# Patient Record
Sex: FEMALE | Race: WHITE | NOT HISPANIC OR LATINO | Employment: FULL TIME | URBAN - METROPOLITAN AREA
[De-identification: names, ages, dates, MRNs, and addresses within clinical notes are randomized per-mention and may not be internally consistent; named-entity substitution may affect disease eponyms.]

---

## 2017-09-10 ENCOUNTER — APPOINTMENT (EMERGENCY)
Dept: CT IMAGING | Facility: HOSPITAL | Age: 42
End: 2017-09-10
Payer: COMMERCIAL

## 2017-09-10 ENCOUNTER — HOSPITAL ENCOUNTER (EMERGENCY)
Facility: HOSPITAL | Age: 42
Discharge: HOME | End: 2017-09-10
Admitting: EMERGENCY MEDICINE
Payer: COMMERCIAL

## 2017-09-10 ENCOUNTER — APPOINTMENT (EMERGENCY)
Dept: RADIOLOGY | Facility: HOSPITAL | Age: 42
End: 2017-09-10
Payer: COMMERCIAL

## 2017-09-10 VITALS
HEART RATE: 76 BPM | SYSTOLIC BLOOD PRESSURE: 137 MMHG | DIASTOLIC BLOOD PRESSURE: 74 MMHG | OXYGEN SATURATION: 99 % | WEIGHT: 120.9 LBS | RESPIRATION RATE: 18 BRPM | TEMPERATURE: 98.1 F

## 2017-09-10 DIAGNOSIS — R07.89 CHEST WALL PAIN: Primary | ICD-10-CM

## 2017-09-10 LAB
ALBUMIN SERPL BCP-MCNC: 3.8 G/DL (ref 3.5–5)
ALP SERPL-CCNC: 69 U/L (ref 46–116)
ALT SERPL W P-5'-P-CCNC: 20 U/L (ref 12–78)
ANION GAP SERPL CALCULATED.3IONS-SCNC: 3 MMOL/L (ref 4–13)
APTT PPP: 25 SECONDS (ref 23–35)
AST SERPL W P-5'-P-CCNC: 16 U/L (ref 5–45)
ATRIAL RATE: 80 BPM
ATRIAL RATE: 83 BPM
BACTERIA UR QL AUTO: ABNORMAL /HPF
BASOPHILS # BLD AUTO: 0.01 THOUSANDS/ΜL (ref 0–0.1)
BASOPHILS NFR BLD AUTO: 0 % (ref 0–1)
BILIRUB SERPL-MCNC: 0.4 MG/DL (ref 0.2–1)
BILIRUB UR QL STRIP: NEGATIVE
BUN SERPL-MCNC: 12 MG/DL (ref 5–25)
CALCIUM SERPL-MCNC: 9.1 MG/DL (ref 8.3–10.1)
CHLORIDE SERPL-SCNC: 107 MMOL/L (ref 100–108)
CLARITY UR: CLEAR
CO2 SERPL-SCNC: 29 MMOL/L (ref 21–32)
COLOR UR: YELLOW
COLOR, POC: YELLOW
CREAT SERPL-MCNC: 0.75 MG/DL (ref 0.6–1.3)
EOSINOPHIL # BLD AUTO: 0.26 THOUSAND/ΜL (ref 0–0.61)
EOSINOPHIL NFR BLD AUTO: 3 % (ref 0–6)
ERYTHROCYTE [DISTWIDTH] IN BLOOD BY AUTOMATED COUNT: 12.7 % (ref 11.6–15.1)
ERYTHROCYTE [SEDIMENTATION RATE] IN BLOOD: 1 MM/HOUR (ref 0–20)
GFR SERPL CREATININE-BSD FRML MDRD: 100 ML/MIN/1.73SQ M
GLUCOSE SERPL-MCNC: 91 MG/DL (ref 65–140)
GLUCOSE UR STRIP-MCNC: NEGATIVE MG/DL
HCG UR QL: NEGATIVE
HCT VFR BLD AUTO: 39.2 % (ref 34.8–46.1)
HGB BLD-MCNC: 13.4 G/DL (ref 11.5–15.4)
HGB UR QL STRIP.AUTO: ABNORMAL
INR PPP: 0.94 (ref 0.86–1.16)
KETONES UR STRIP-MCNC: NEGATIVE MG/DL
LACTATE SERPL-SCNC: 0.5 MMOL/L (ref 0.5–2)
LEUKOCYTE ESTERASE UR QL STRIP: NEGATIVE
LIPASE SERPL-CCNC: 141 U/L (ref 73–393)
LYMPHOCYTES # BLD AUTO: 3.09 THOUSANDS/ΜL (ref 0.6–4.47)
LYMPHOCYTES NFR BLD AUTO: 31 % (ref 14–44)
MCH RBC QN AUTO: 33.2 PG (ref 26.8–34.3)
MCHC RBC AUTO-ENTMCNC: 34.2 G/DL (ref 31.4–37.4)
MCV RBC AUTO: 97 FL (ref 82–98)
MONOCYTES # BLD AUTO: 0.53 THOUSAND/ΜL (ref 0.17–1.22)
MONOCYTES NFR BLD AUTO: 5 % (ref 4–12)
NEUTROPHILS # BLD AUTO: 6.22 THOUSANDS/ΜL (ref 1.85–7.62)
NEUTS SEG NFR BLD AUTO: 61 % (ref 43–75)
NITRITE UR QL STRIP: NEGATIVE
NON-SQ EPI CELLS URNS QL MICRO: ABNORMAL /HPF
NRBC BLD AUTO-RTO: 0 /100 WBCS
P AXIS: 48 DEGREES
P AXIS: 63 DEGREES
PH UR STRIP.AUTO: 6.5 [PH] (ref 4.5–8)
PLATELET # BLD AUTO: 250 THOUSANDS/UL (ref 149–390)
PMV BLD AUTO: 10.8 FL (ref 8.9–12.7)
POTASSIUM SERPL-SCNC: 4.1 MMOL/L (ref 3.5–5.3)
PR INTERVAL: 134 MS
PR INTERVAL: 134 MS
PROT SERPL-MCNC: 6.8 G/DL (ref 6.4–8.2)
PROT UR STRIP-MCNC: NEGATIVE MG/DL
PROTHROMBIN TIME: 12.6 SECONDS (ref 12.1–14.4)
QRS AXIS: 63 DEGREES
QRS AXIS: 67 DEGREES
QRSD INTERVAL: 74 MS
QRSD INTERVAL: 78 MS
QT INTERVAL: 350 MS
QT INTERVAL: 390 MS
QTC INTERVAL: 411 MS
QTC INTERVAL: 449 MS
RBC # BLD AUTO: 4.04 MILLION/UL (ref 3.81–5.12)
RBC #/AREA URNS AUTO: ABNORMAL /HPF
SODIUM SERPL-SCNC: 139 MMOL/L (ref 136–145)
SP GR UR STRIP.AUTO: 1.02 (ref 1–1.03)
T WAVE AXIS: 52 DEGREES
T WAVE AXIS: 73 DEGREES
TROPONIN I SERPL-MCNC: <0.02 NG/ML
UROBILINOGEN UR QL STRIP.AUTO: 0.2 E.U./DL
VENTRICULAR RATE: 80 BPM
VENTRICULAR RATE: 83 BPM
WBC # BLD AUTO: 10.11 THOUSAND/UL (ref 4.31–10.16)
WBC #/AREA URNS AUTO: ABNORMAL /HPF

## 2017-09-10 PROCEDURE — 85610 PROTHROMBIN TIME: CPT | Performed by: PHYSICIAN ASSISTANT

## 2017-09-10 PROCEDURE — 36415 COLL VENOUS BLD VENIPUNCTURE: CPT | Performed by: PHYSICIAN ASSISTANT

## 2017-09-10 PROCEDURE — 85025 COMPLETE CBC W/AUTO DIFF WBC: CPT

## 2017-09-10 PROCEDURE — 81025 URINE PREGNANCY TEST: CPT | Performed by: PHYSICIAN ASSISTANT

## 2017-09-10 PROCEDURE — 99285 EMERGENCY DEPT VISIT HI MDM: CPT

## 2017-09-10 PROCEDURE — 83605 ASSAY OF LACTIC ACID: CPT | Performed by: PHYSICIAN ASSISTANT

## 2017-09-10 PROCEDURE — 71275 CT ANGIOGRAPHY CHEST: CPT

## 2017-09-10 PROCEDURE — 93005 ELECTROCARDIOGRAM TRACING: CPT | Performed by: PHYSICIAN ASSISTANT

## 2017-09-10 PROCEDURE — 85730 THROMBOPLASTIN TIME PARTIAL: CPT | Performed by: PHYSICIAN ASSISTANT

## 2017-09-10 PROCEDURE — 81002 URINALYSIS NONAUTO W/O SCOPE: CPT | Performed by: PHYSICIAN ASSISTANT

## 2017-09-10 PROCEDURE — 36415 COLL VENOUS BLD VENIPUNCTURE: CPT

## 2017-09-10 PROCEDURE — 81001 URINALYSIS AUTO W/SCOPE: CPT

## 2017-09-10 PROCEDURE — 85652 RBC SED RATE AUTOMATED: CPT | Performed by: PHYSICIAN ASSISTANT

## 2017-09-10 PROCEDURE — 84484 ASSAY OF TROPONIN QUANT: CPT

## 2017-09-10 PROCEDURE — 71020 HB CHEST X-RAY 2VW FRONTAL&LATL: CPT

## 2017-09-10 PROCEDURE — 83690 ASSAY OF LIPASE: CPT | Performed by: PHYSICIAN ASSISTANT

## 2017-09-10 PROCEDURE — 80053 COMPREHEN METABOLIC PANEL: CPT

## 2017-09-10 PROCEDURE — 74177 CT ABD & PELVIS W/CONTRAST: CPT

## 2017-09-10 PROCEDURE — 96360 HYDRATION IV INFUSION INIT: CPT

## 2017-09-10 PROCEDURE — 93005 ELECTROCARDIOGRAM TRACING: CPT

## 2017-09-10 RX ORDER — BUPRENORPHINE HYDROCHLORIDE AND NALOXONE HYDROCHLORIDE DIHYDRATE 2; .5 MG/1; MG/1
TABLET SUBLINGUAL DAILY
COMMUNITY
End: 2019-01-07

## 2017-09-10 RX ORDER — NAPROXEN 500 MG/1
500 TABLET ORAL 2 TIMES DAILY WITH MEALS
Qty: 30 TABLET | Refills: 0 | Status: SHIPPED | OUTPATIENT
Start: 2017-09-10 | End: 2019-01-07

## 2017-09-10 RX ADMIN — IOHEXOL 100 ML: 350 INJECTION, SOLUTION INTRAVENOUS at 07:24

## 2017-09-10 RX ADMIN — SODIUM CHLORIDE 1000 ML: 0.9 INJECTION, SOLUTION INTRAVENOUS at 07:06

## 2019-01-07 ENCOUNTER — HOSPITAL ENCOUNTER (OUTPATIENT)
Facility: HOSPITAL | Age: 44
Setting detail: OBSERVATION
Discharge: HOME | End: 2019-01-09
Attending: EMERGENCY MEDICINE | Admitting: FAMILY MEDICINE
Payer: COMMERCIAL

## 2019-01-07 ENCOUNTER — APPOINTMENT (EMERGENCY)
Dept: RADIOLOGY | Facility: HOSPITAL | Age: 44
End: 2019-01-07
Payer: COMMERCIAL

## 2019-01-07 ENCOUNTER — APPOINTMENT (EMERGENCY)
Dept: CT IMAGING | Facility: HOSPITAL | Age: 44
End: 2019-01-07
Payer: COMMERCIAL

## 2019-01-07 DIAGNOSIS — R05.9 COUGH: ICD-10-CM

## 2019-01-07 DIAGNOSIS — J20.9 ACUTE BRONCHITIS: ICD-10-CM

## 2019-01-07 DIAGNOSIS — D72.829 LEUKOCYTOSIS: Primary | ICD-10-CM

## 2019-01-07 PROBLEM — R53.83 FATIGUE: Status: ACTIVE | Noted: 2019-01-07

## 2019-01-07 PROBLEM — Z72.0 TOBACCO ABUSE: Status: ACTIVE | Noted: 2019-01-07

## 2019-01-07 PROBLEM — I95.9 HYPOTENSION: Status: ACTIVE | Noted: 2019-01-07

## 2019-01-07 PROBLEM — R50.9 FEVER: Status: ACTIVE | Noted: 2019-01-07

## 2019-01-07 PROBLEM — R07.9 CHEST PAIN: Status: ACTIVE | Noted: 2019-01-07

## 2019-01-07 LAB
ANION GAP SERPL CALCULATED.3IONS-SCNC: 10 MMOL/L (ref 4–13)
BASOPHILS # BLD AUTO: 0.04 THOUSANDS/ΜL (ref 0–0.1)
BASOPHILS NFR BLD AUTO: 0 % (ref 0–1)
BUN SERPL-MCNC: 10 MG/DL (ref 5–25)
CALCIUM SERPL-MCNC: 8.7 MG/DL (ref 8.3–10.1)
CHLORIDE SERPL-SCNC: 98 MMOL/L (ref 100–108)
CO2 SERPL-SCNC: 26 MMOL/L (ref 21–32)
CREAT SERPL-MCNC: 0.85 MG/DL (ref 0.6–1.3)
EOSINOPHIL # BLD AUTO: 0.01 THOUSAND/ΜL (ref 0–0.61)
EOSINOPHIL NFR BLD AUTO: 0 % (ref 0–6)
ERYTHROCYTE [DISTWIDTH] IN BLOOD BY AUTOMATED COUNT: 12.4 % (ref 11.6–15.1)
FLUAV AG SPEC QL IA: NEGATIVE
FLUBV AG SPEC QL IA: NEGATIVE
GFR SERPL CREATININE-BSD FRML MDRD: 85 ML/MIN/1.73SQ M
GLUCOSE SERPL-MCNC: 102 MG/DL (ref 65–140)
HCT VFR BLD AUTO: 40.2 % (ref 34.8–46.1)
HGB BLD-MCNC: 13 G/DL (ref 11.5–15.4)
IMM GRANULOCYTES # BLD AUTO: 0.12 THOUSAND/UL (ref 0–0.2)
IMM GRANULOCYTES NFR BLD AUTO: 1 % (ref 0–2)
LACTATE SERPL-SCNC: 0.7 MMOL/L (ref 0.5–2)
LYMPHOCYTES # BLD AUTO: 2.17 THOUSANDS/ΜL (ref 0.6–4.47)
LYMPHOCYTES NFR BLD AUTO: 10 % (ref 14–44)
MCH RBC QN AUTO: 31.9 PG (ref 26.8–34.3)
MCHC RBC AUTO-ENTMCNC: 32.3 G/DL (ref 31.4–37.4)
MCV RBC AUTO: 99 FL (ref 82–98)
MONOCYTES # BLD AUTO: 1.98 THOUSAND/ΜL (ref 0.17–1.22)
MONOCYTES NFR BLD AUTO: 9 % (ref 4–12)
NEUTROPHILS # BLD AUTO: 16.75 THOUSANDS/ΜL (ref 1.85–7.62)
NEUTS SEG NFR BLD AUTO: 80 % (ref 43–75)
NRBC BLD AUTO-RTO: 0 /100 WBCS
PLATELET # BLD AUTO: 269 THOUSANDS/UL (ref 149–390)
PMV BLD AUTO: 9.8 FL (ref 8.9–12.7)
POTASSIUM SERPL-SCNC: 4.8 MMOL/L (ref 3.5–5.3)
RBC # BLD AUTO: 4.08 MILLION/UL (ref 3.81–5.12)
SODIUM SERPL-SCNC: 134 MMOL/L (ref 136–145)
TROPONIN I SERPL-MCNC: <0.02 NG/ML
WBC # BLD AUTO: 21.07 THOUSAND/UL (ref 4.31–10.16)

## 2019-01-07 PROCEDURE — 36415 COLL VENOUS BLD VENIPUNCTURE: CPT | Performed by: EMERGENCY MEDICINE

## 2019-01-07 PROCEDURE — 83605 ASSAY OF LACTIC ACID: CPT | Performed by: EMERGENCY MEDICINE

## 2019-01-07 PROCEDURE — 93005 ELECTROCARDIOGRAM TRACING: CPT

## 2019-01-07 PROCEDURE — 80048 BASIC METABOLIC PNL TOTAL CA: CPT | Performed by: EMERGENCY MEDICINE

## 2019-01-07 PROCEDURE — 85025 COMPLETE CBC W/AUTO DIFF WBC: CPT | Performed by: EMERGENCY MEDICINE

## 2019-01-07 PROCEDURE — 71250 CT THORAX DX C-: CPT

## 2019-01-07 PROCEDURE — 96374 THER/PROPH/DIAG INJ IV PUSH: CPT

## 2019-01-07 PROCEDURE — 99285 EMERGENCY DEPT VISIT HI MDM: CPT

## 2019-01-07 PROCEDURE — 99220 PR INITIAL OBSERVATION CARE/DAY 70 MINUTES: CPT | Performed by: PHYSICIAN ASSISTANT

## 2019-01-07 PROCEDURE — 87631 RESP VIRUS 3-5 TARGETS: CPT | Performed by: EMERGENCY MEDICINE

## 2019-01-07 PROCEDURE — 71046 X-RAY EXAM CHEST 2 VIEWS: CPT

## 2019-01-07 PROCEDURE — 84484 ASSAY OF TROPONIN QUANT: CPT | Performed by: PHYSICIAN ASSISTANT

## 2019-01-07 PROCEDURE — 96361 HYDRATE IV INFUSION ADD-ON: CPT

## 2019-01-07 PROCEDURE — 87040 BLOOD CULTURE FOR BACTERIA: CPT | Performed by: EMERGENCY MEDICINE

## 2019-01-07 RX ORDER — ACETAMINOPHEN 325 MG/1
650 TABLET ORAL EVERY 6 HOURS PRN
Status: DISCONTINUED | OUTPATIENT
Start: 2019-01-07 | End: 2019-01-09 | Stop reason: HOSPADM

## 2019-01-07 RX ORDER — GUAIFENESIN 600 MG
600 TABLET, EXTENDED RELEASE 12 HR ORAL EVERY 12 HOURS SCHEDULED
Status: DISCONTINUED | OUTPATIENT
Start: 2019-01-07 | End: 2019-01-09 | Stop reason: HOSPADM

## 2019-01-07 RX ORDER — ONDANSETRON 2 MG/ML
4 INJECTION INTRAMUSCULAR; INTRAVENOUS EVERY 6 HOURS PRN
Status: DISCONTINUED | OUTPATIENT
Start: 2019-01-07 | End: 2019-01-09 | Stop reason: HOSPADM

## 2019-01-07 RX ORDER — SODIUM CHLORIDE 9 MG/ML
125 INJECTION, SOLUTION INTRAVENOUS CONTINUOUS
Status: DISCONTINUED | OUTPATIENT
Start: 2019-01-07 | End: 2019-01-09

## 2019-01-07 RX ORDER — DIPHENHYDRAMINE HCL 25 MG
25 TABLET ORAL ONCE
Status: COMPLETED | OUTPATIENT
Start: 2019-01-07 | End: 2019-01-07

## 2019-01-07 RX ORDER — KETOROLAC TROMETHAMINE 30 MG/ML
15 INJECTION, SOLUTION INTRAMUSCULAR; INTRAVENOUS ONCE
Status: COMPLETED | OUTPATIENT
Start: 2019-01-07 | End: 2019-01-07

## 2019-01-07 RX ORDER — METOCLOPRAMIDE HYDROCHLORIDE 5 MG/ML
10 INJECTION INTRAMUSCULAR; INTRAVENOUS ONCE
Status: COMPLETED | OUTPATIENT
Start: 2019-01-07 | End: 2019-01-07

## 2019-01-07 RX ADMIN — SODIUM CHLORIDE 1000 ML: 0.9 INJECTION, SOLUTION INTRAVENOUS at 18:52

## 2019-01-07 RX ADMIN — SODIUM CHLORIDE 125 ML/HR: 0.9 INJECTION, SOLUTION INTRAVENOUS at 21:12

## 2019-01-07 RX ADMIN — MORPHINE SULFATE 2 MG: 2 INJECTION, SOLUTION INTRAMUSCULAR; INTRAVENOUS at 18:50

## 2019-01-07 RX ADMIN — SODIUM CHLORIDE 1000 ML: 0.9 INJECTION, SOLUTION INTRAVENOUS at 15:37

## 2019-01-07 RX ADMIN — CEFTRIAXONE 1000 MG: 1 INJECTION, POWDER, FOR SOLUTION INTRAMUSCULAR; INTRAVENOUS at 19:26

## 2019-01-07 RX ADMIN — METOCLOPRAMIDE 10 MG: 5 INJECTION, SOLUTION INTRAMUSCULAR; INTRAVENOUS at 22:25

## 2019-01-07 RX ADMIN — DIPHENHYDRAMINE HCL 25 MG: 25 TABLET ORAL at 22:25

## 2019-01-07 RX ADMIN — GUAIFENESIN 600 MG: 600 TABLET, EXTENDED RELEASE ORAL at 22:25

## 2019-01-07 RX ADMIN — KETOROLAC TROMETHAMINE 15 MG: 30 INJECTION, SOLUTION INTRAMUSCULAR at 22:25

## 2019-01-07 RX ADMIN — AZITHROMYCIN MONOHYDRATE 500 MG: 500 INJECTION, POWDER, LYOPHILIZED, FOR SOLUTION INTRAVENOUS at 22:26

## 2019-01-07 RX ADMIN — KETOROLAC TROMETHAMINE 15 MG: 30 INJECTION, SOLUTION INTRAMUSCULAR at 15:36

## 2019-01-07 NOTE — ED PROVIDER NOTES
History  Chief Complaint   Patient presents with    Chest Pain     Pt reports being sick for the past three weeks  Pt reports intermittent fevers/fatigue/bodyavhes/   Pt reports sharp chest pain since last night  Pt states pain increases with deep breath and movement      46-year-old female with a history of pneumonia presents with worsening of her URI symptoms over the last 3 days and right-sided chest pain  Patient states that she has been sick over [place]  She was diagnosed clinically with influenza and was starting to feel better until 3 days ago when she states she developed fevers up to 103 and cough sometimes productive of yellow sputum  She had 2 episodes of vomiting today  No diarrhea  No known ill contacts or recent travel  History provided by:  Patient   used: No    Chest Pain   Pain location:  R chest  Pain quality: sharp    Pain radiates to:  Does not radiate  Pain radiates to the back: no    Pain severity:  Unable to specify  Onset quality:  Gradual  Duration:  1 day  Timing:  Constant  Progression:  Unchanged  Chronicity:  New  Context: breathing and movement    Relieved by:  Nothing  Worsened by:  Deep breathing and coughing  Ineffective treatments: Motrin    Associated symptoms: cough, fever, nausea, shortness of breath and vomiting    Associated symptoms: no abdominal pain, no altered mental status, no anorexia, no anxiety, no back pain, no claudication, no diaphoresis, no dizziness, no dysphagia, no fatigue, no headache, no heartburn, no lower extremity edema, no near-syncope, no numbness, no orthopnea, no palpitations, no PND, no syncope and no weakness    Cough:     Cough characteristics:  Productive    Sputum characteristics:  Yellow    Severity:  Unable to specify    Onset quality:  Gradual    Duration:  3 days    Timing:  Intermittent    Progression:  Unchanged    Chronicity:  New  Fever:     Duration:  3 days    Timing:  Intermittent    Max temp PTA (F): 103    Temp source:  Oral    Progression:  Unchanged  Shortness of breath:     Severity:  Unable to specify    Onset quality:  Gradual    Duration:  3 days    Timing:  Intermittent    Progression:  Unchanged  Risk factors: smoking    Risk factors: no coronary artery disease, no diabetes mellitus, no hypertension, no immobilization, not obese, no prior DVT/PE and no surgery        None       Past Medical History:   Diagnosis Date    Ectopic pregnancy     Pneumonia        History reviewed  No pertinent surgical history  History reviewed  No pertinent family history  I have reviewed and agree with the history as documented  Social History   Substance Use Topics    Smoking status: Current Every Day Smoker     Packs/day: 0 50    Smokeless tobacco: Never Used    Alcohol use Yes      Comment: occasionally        Review of Systems   Constitutional: Positive for fever  Negative for chills, diaphoresis and fatigue  HENT: Negative  Negative for congestion, rhinorrhea, sore throat and trouble swallowing  Eyes: Negative  Negative for discharge, redness and itching  Respiratory: Positive for cough and shortness of breath  Negative for apnea, chest tightness and wheezing  Cardiovascular: Positive for chest pain  Negative for palpitations, orthopnea, claudication, leg swelling, syncope, PND and near-syncope  Gastrointestinal: Positive for nausea and vomiting  Negative for abdominal pain, anorexia and heartburn  Endocrine: Negative  Genitourinary: Negative  Negative for flank pain, frequency and urgency  Musculoskeletal: Negative  Negative for back pain  Skin: Negative  Allergic/Immunologic: Negative  Neurological: Negative  Negative for dizziness, syncope, weakness, light-headedness, numbness and headaches  Hematological: Negative  All other systems reviewed and are negative  Physical Exam  Physical Exam   Constitutional: She is oriented to person, place, and time   She appears well-developed and well-nourished  Non-toxic appearance  She does not have a sickly appearance  She does not appear ill  No distress  HENT:   Head: Normocephalic and atraumatic  Right Ear: Tympanic membrane and external ear normal    Left Ear: Tympanic membrane and external ear normal    Mouth/Throat: Oropharynx is clear and moist  No oropharyngeal exudate  Eyes: Pupils are equal, round, and reactive to light  Conjunctivae and EOM are normal  Right eye exhibits no discharge  Left eye exhibits no discharge  Neck: Normal range of motion  Neck supple  Cardiovascular: Normal rate, regular rhythm and normal heart sounds  Exam reveals no gallop and no friction rub  No murmur heard  Pulmonary/Chest: Effort normal and breath sounds normal  No stridor  No respiratory distress  She has no wheezes  She has no rales  She exhibits no tenderness  Abdominal: Soft  Bowel sounds are normal  She exhibits no distension and no mass  There is no tenderness  No hernia  Lymphadenopathy:     She has no cervical adenopathy  Neurological: She is alert and oriented to person, place, and time  She has normal reflexes  She exhibits normal muscle tone  Skin: Skin is warm and dry  No rash noted  She is not diaphoretic  No erythema  No pallor  Psychiatric: She has a normal mood and affect  Nursing note and vitals reviewed        Vital Signs  ED Triage Vitals   Temperature Pulse Respirations Blood Pressure SpO2   01/07/19 1509 01/07/19 1509 01/07/19 1509 01/07/19 1509 01/07/19 1509   99 9 °F (37 7 °C) 79 16 105/60 99 %      Temp Source Heart Rate Source Patient Position - Orthostatic VS BP Location FiO2 (%)   01/07/19 1509 01/07/19 1509 01/07/19 1509 01/07/19 1509 --   Oral Monitor Sitting Right arm       Pain Score       01/07/19 1536       9           Vitals:    01/07/19 1509 01/07/19 1706 01/07/19 1819 01/07/19 1827   BP: 105/60 90/55 (!) 85/50 91/55   Pulse: 79 77 95    Patient Position - Orthostatic VS: Sitting Lying Lying        Visual Acuity      ED Medications  Medications   ceftriaxone (ROCEPHIN) 1 g/50 mL in dextrose IVPB (not administered)   azithromycin (ZITHROMAX) 500 mg in sodium chloride 0 9% 250mL IVPB 500 mg (not administered)   sodium chloride 0 9 % bolus 1,000 mL (not administered)   morphine injection 2 mg (not administered)   sodium chloride 0 9 % bolus 1,000 mL (0 mL Intravenous Stopped 1/7/19 1706)   ketorolac (TORADOL) injection 15 mg (15 mg Intravenous Given 1/7/19 1536)       Diagnostic Studies  Results Reviewed     Procedure Component Value Units Date/Time    Procalcitonin [[id number]]     Lab Status:  No result Specimen:  Blood     Blood culture #1 [[id number]]     Lab Status:  No result Specimen:  Blood     Blood culture #2 [[id number]]     Lab Status:  No result Specimen:  Blood     Lactic acid, plasma [[id number]]  (Normal) Collected:  01/07/19 1727    Lab Status:  Final result Specimen:  Blood from Arm, Right Updated:  01/07/19 1757     LACTIC ACID 0 7 mmol/L     Narrative:         Result may be elevated if tourniquet was used during collection  Rapid Influenza Screen with Reflex PCR [[id number]]  (Normal) Collected:  01/07/19 1553    Lab Status:  Final result Specimen:  Nasopharyngeal from Nasopharyngeal Swab Updated:  01/07/19 1622     Rapid Influenza A Ag Negative     Rapid Influenza B Ag Negative    INFLUENZA A/B AND RSV, PCR [[id number]] Collected:  01/07/19 1553    Lab Status:   In process Specimen:  Nasopharyngeal from Nasopharyngeal Swab Updated:  01/07/19 8231    Basic metabolic panel [[id number]]  (Abnormal) Collected:  01/07/19 1535    Lab Status:  Final result Specimen:  Blood from Arm, Right Updated:  01/07/19 1558     Sodium 134 (L) mmol/L      Potassium 4 8 mmol/L      Chloride 98 (L) mmol/L      CO2 26 mmol/L      ANION GAP 10 mmol/L      BUN 10 mg/dL      Creatinine 0 85 mg/dL      Glucose 102 mg/dL      Calcium 8 7 mg/dL      eGFR 85 ml/min/1 73sq m     Narrative:         National Kidney Disease Education Program recommendations are as follows:  GFR calculation is accurate only with a steady state creatinine  Chronic Kidney disease less than 60 ml/min/1 73 sq  meters  Kidney failure less than 15 ml/min/1 73 sq  meters  CBC and differential [[id number]]  (Abnormal) Collected:  01/07/19 1535    Lab Status:  Final result Specimen:  Blood from Arm, Right Updated:  01/07/19 1542     WBC 21 07 (H) Thousand/uL      RBC 4 08 Million/uL      Hemoglobin 13 0 g/dL      Hematocrit 40 2 %      MCV 99 (H) fL      MCH 31 9 pg      MCHC 32 3 g/dL      RDW 12 4 %      MPV 9 8 fL      Platelets 787 Thousands/uL      nRBC 0 /100 WBCs      Neutrophils Relative 80 (H) %      Immat GRANS % 1 %      Lymphocytes Relative 10 (L) %      Monocytes Relative 9 %      Eosinophils Relative 0 %      Basophils Relative 0 %      Neutrophils Absolute 16 75 (H) Thousands/µL      Immature Grans Absolute 0 12 Thousand/uL      Lymphocytes Absolute 2 17 Thousands/µL      Monocytes Absolute 1 98 (H) Thousand/µL      Eosinophils Absolute 0 01 Thousand/µL      Basophils Absolute 0 04 Thousands/µL                  XR chest 2 views   Final Result by Verena Gloria MD (01/07 1743)      No acute cardiopulmonary disease  Workstation performed: [id number]         CT chest without contrast   Final Result by Sarah Nuñez MD (01/07 1633)      1  Scattered dependent debris noted in the upper trachea  Variable mild bronchial wall thickening  Correlate for bronchial airways disease  No findings for focal infiltrate  The study was marked in EPIC for significant notification  Workstation performed: [id number]                    Procedures  Procedures       Phone Contacts  ED Phone Contact    ED Course  ED Course as of Jan 07 1840 Mon Jan 07, 2019   [facility] [address] Spoke with patient regarding results  She is in agreement with admission  She is complaining of lower sternal pain every time she takes a deep breath or moves    On exam she has no abdominal tenderness  MDM  Number of Diagnoses or Management Options  Diagnosis management comments: 21-year-old female presents with ongoing symptoms of fevers and coughing  She now has sharp right-sided chest pain with cough and deep breathing  She was febrile to 103 at home  On exam she appears well in no distress  Her lungs are clear  She has a history of pneumonia  Will check basic labs, give Toradol for pain and IV fluids for dehydration due to lack of p  O  Intake and vomiting today  Will do chest x-ray to rule out pneumonia         Amount and/or Complexity of Data Reviewed  Clinical lab tests: ordered and reviewed  Tests in the radiology section of CPT®: ordered and reviewed  Independent visualization of images, tracings, or specimens: yes      CritCare Time    Disposition  Final diagnoses:   Leukocytosis   Acute bronchitis     Time reflects when diagnosis was documented in both MDM as applicable and the Disposition within this note     Time User Action Codes Description Comment    1/7/2019  6:39 PM Abhishek Myers Add [O46 [id number]] Leukocytosis     1/7/2019  6:39 PM Maeola Hashimoto A Add [J20 9] Acute bronchitis       ED Disposition     ED Disposition Condition Comment    Admit  Case was discussed with dr Imer Foster and the patient's admission status was agreed to be Admission Status: observation status to the service of Dr Imer Foster   Follow-up Information    None         Patient's Medications   Discharge Prescriptions    No medications on file     No discharge procedures on file      ED Provider  Electronically Signed by           Omar March, DO 01/07/19 5448

## 2019-01-07 NOTE — ED PROCEDURE NOTE
PROCEDURE  ECG 12 Lead Documentation  Date/Time: 1/7/2019 3:54 PM  Performed by: Peri Sifuentes  Authorized by: Peri Sifuentes     Indications / Diagnosis:  Chest pain  ECG reviewed by me, the ED Provider: yes    Patient location:  ED  Interpretation:     Interpretation: normal    Rate:     ECG rate assessment: normal    Rhythm:     Rhythm: sinus rhythm    Ectopy:     Ectopy: none    Conduction:     Conduction: normal    ST segments:     ST segments:  Normal  T waves:     T waves: normal           Donovan Balderas DO  01/07/19 1554

## 2019-01-08 LAB
ALBUMIN SERPL BCP-MCNC: 2.3 G/DL (ref 3.5–5)
ALP SERPL-CCNC: 93 U/L (ref 46–116)
ALT SERPL W P-5'-P-CCNC: 36 U/L (ref 12–78)
AMPHETAMINES SERPL QL SCN: NEGATIVE
ANION GAP SERPL CALCULATED.3IONS-SCNC: 9 MMOL/L (ref 4–13)
AST SERPL W P-5'-P-CCNC: 24 U/L (ref 5–45)
BACTERIA UR QL AUTO: ABNORMAL /HPF
BARBITURATES UR QL: NEGATIVE
BASOPHILS # BLD AUTO: 0.03 THOUSANDS/ΜL (ref 0–0.1)
BASOPHILS NFR BLD AUTO: 0 % (ref 0–1)
BENZODIAZ UR QL: NEGATIVE
BILIRUB SERPL-MCNC: 0.47 MG/DL (ref 0.2–1)
BILIRUB UR QL STRIP: NEGATIVE
BUN SERPL-MCNC: 10 MG/DL (ref 5–25)
CALCIUM SERPL-MCNC: 7.7 MG/DL (ref 8.3–10.1)
CHLORIDE SERPL-SCNC: 106 MMOL/L (ref 100–108)
CHOLEST SERPL-MCNC: 136 MG/DL (ref 50–200)
CLARITY UR: CLEAR
CO2 SERPL-SCNC: 22 MMOL/L (ref 21–32)
COCAINE UR QL: NEGATIVE
COLOR UR: YELLOW
CREAT SERPL-MCNC: 0.72 MG/DL (ref 0.6–1.3)
EOSINOPHIL # BLD AUTO: 0.06 THOUSAND/ΜL (ref 0–0.61)
EOSINOPHIL NFR BLD AUTO: 1 % (ref 0–6)
ERYTHROCYTE [DISTWIDTH] IN BLOOD BY AUTOMATED COUNT: 12.6 % (ref 11.6–15.1)
FLUAV AG SPEC QL: NORMAL
FLUBV AG SPEC QL: NORMAL
GFR SERPL CREATININE-BSD FRML MDRD: 104 ML/MIN/1.73SQ M
GLUCOSE P FAST SERPL-MCNC: 132 MG/DL (ref 65–99)
GLUCOSE SERPL-MCNC: 132 MG/DL (ref 65–140)
GLUCOSE UR STRIP-MCNC: NEGATIVE MG/DL
HCT VFR BLD AUTO: 32.3 % (ref 34.8–46.1)
HDLC SERPL-MCNC: 27 MG/DL (ref 40–60)
HGB BLD-MCNC: 10.3 G/DL (ref 11.5–15.4)
HGB UR QL STRIP.AUTO: ABNORMAL
IMM GRANULOCYTES # BLD AUTO: 0.04 THOUSAND/UL (ref 0–0.2)
IMM GRANULOCYTES NFR BLD AUTO: 0 % (ref 0–2)
KETONES UR STRIP-MCNC: NEGATIVE MG/DL
L PNEUMO1 AG UR QL IA.RAPID: NEGATIVE
LDLC SERPL CALC-MCNC: 90 MG/DL (ref 0–100)
LEUKOCYTE ESTERASE UR QL STRIP: NEGATIVE
LYMPHOCYTES # BLD AUTO: 2.26 THOUSANDS/ΜL (ref 0.6–4.47)
LYMPHOCYTES NFR BLD AUTO: 18 % (ref 14–44)
MCH RBC QN AUTO: 31.8 PG (ref 26.8–34.3)
MCHC RBC AUTO-ENTMCNC: 31.9 G/DL (ref 31.4–37.4)
MCV RBC AUTO: 100 FL (ref 82–98)
METHADONE UR QL: NEGATIVE
MONOCYTES # BLD AUTO: 1.22 THOUSAND/ΜL (ref 0.17–1.22)
MONOCYTES NFR BLD AUTO: 10 % (ref 4–12)
NEUTROPHILS # BLD AUTO: 8.72 THOUSANDS/ΜL (ref 1.85–7.62)
NEUTS SEG NFR BLD AUTO: 71 % (ref 43–75)
NITRITE UR QL STRIP: NEGATIVE
NON-SQ EPI CELLS URNS QL MICRO: ABNORMAL /HPF
NONHDLC SERPL-MCNC: 109 MG/DL
NRBC BLD AUTO-RTO: 0 /100 WBCS
OPIATES UR QL SCN: POSITIVE
PCP UR QL: NEGATIVE
PH UR STRIP.AUTO: 6 [PH] (ref 4.5–8)
PLATELET # BLD AUTO: 200 THOUSANDS/UL (ref 149–390)
PMV BLD AUTO: 10.7 FL (ref 8.9–12.7)
POTASSIUM SERPL-SCNC: 3.7 MMOL/L (ref 3.5–5.3)
PROCALCITONIN SERPL-MCNC: <0.05 NG/ML
PROT SERPL-MCNC: 5.6 G/DL (ref 6.4–8.2)
PROT UR STRIP-MCNC: NEGATIVE MG/DL
RBC # BLD AUTO: 3.24 MILLION/UL (ref 3.81–5.12)
RBC #/AREA URNS AUTO: ABNORMAL /HPF
RSV B RNA SPEC QL NAA+PROBE: NORMAL
S PNEUM AG UR QL: NEGATIVE
SODIUM SERPL-SCNC: 137 MMOL/L (ref 136–145)
SP GR UR STRIP.AUTO: 1.01 (ref 1–1.03)
THC UR QL: POSITIVE
TRIGL SERPL-MCNC: 94 MG/DL
UROBILINOGEN UR QL STRIP.AUTO: 1 E.U./DL
WBC # BLD AUTO: 12.33 THOUSAND/UL (ref 4.31–10.16)
WBC #/AREA URNS AUTO: ABNORMAL /HPF

## 2019-01-08 PROCEDURE — 99232 SBSQ HOSP IP/OBS MODERATE 35: CPT | Performed by: FAMILY MEDICINE

## 2019-01-08 PROCEDURE — 87449 NOS EACH ORGANISM AG IA: CPT | Performed by: PHYSICIAN ASSISTANT

## 2019-01-08 PROCEDURE — 81001 URINALYSIS AUTO W/SCOPE: CPT | Performed by: PHYSICIAN ASSISTANT

## 2019-01-08 PROCEDURE — 84145 PROCALCITONIN (PCT): CPT | Performed by: PHYSICIAN ASSISTANT

## 2019-01-08 PROCEDURE — 85025 COMPLETE CBC W/AUTO DIFF WBC: CPT | Performed by: PHYSICIAN ASSISTANT

## 2019-01-08 PROCEDURE — 80307 DRUG TEST PRSMV CHEM ANLYZR: CPT | Performed by: PHYSICIAN ASSISTANT

## 2019-01-08 PROCEDURE — 80053 COMPREHEN METABOLIC PANEL: CPT | Performed by: PHYSICIAN ASSISTANT

## 2019-01-08 PROCEDURE — 99225 PR SBSQ OBSERVATION CARE/DAY 25 MINUTES: CPT | Performed by: PHYSICIAN ASSISTANT

## 2019-01-08 PROCEDURE — 83036 HEMOGLOBIN GLYCOSYLATED A1C: CPT | Performed by: PHYSICIAN ASSISTANT

## 2019-01-08 PROCEDURE — 80061 LIPID PANEL: CPT | Performed by: PHYSICIAN ASSISTANT

## 2019-01-08 RX ORDER — BENZONATATE 100 MG/1
100 CAPSULE ORAL ONCE AS NEEDED
Status: COMPLETED | OUTPATIENT
Start: 2019-01-08 | End: 2019-01-09

## 2019-01-08 RX ADMIN — GUAIFENESIN 600 MG: 600 TABLET, EXTENDED RELEASE ORAL at 08:09

## 2019-01-08 RX ADMIN — SODIUM CHLORIDE 125 ML/HR: 0.9 INJECTION, SOLUTION INTRAVENOUS at 06:33

## 2019-01-08 RX ADMIN — SODIUM CHLORIDE 125 ML/HR: 0.9 INJECTION, SOLUTION INTRAVENOUS at 17:34

## 2019-01-08 RX ADMIN — ENOXAPARIN SODIUM 40 MG: 40 INJECTION SUBCUTANEOUS at 08:10

## 2019-01-08 RX ADMIN — ACETAMINOPHEN 650 MG: 325 TABLET, FILM COATED ORAL at 08:09

## 2019-01-08 RX ADMIN — GUAIFENESIN 600 MG: 600 TABLET, EXTENDED RELEASE ORAL at 21:24

## 2019-01-08 RX ADMIN — SODIUM CHLORIDE 1000 ML: 0.9 INJECTION, SOLUTION INTRAVENOUS at 14:56

## 2019-01-08 NOTE — ED NOTES
Primary nurse, Alexia Bess called, informed that pt will be up after second set of blood cultures are collected        Chani No RN  01/07/19 0428

## 2019-01-08 NOTE — PROGRESS NOTES
[facility] Progress Note  Patient: Lucita Jasso 44 y o  female   MRN: [id number]  PCP: No primary care provider on file  Unit/Bed#: E4 -01 Encounter: [id number]  Date Of Visit: 01/08/19    Assessment:    Principal Problem:    Cough  Active Problems:    Leukocytosis    Fever    Fatigue    Chest pain    Hypotension    Tobacco abuse      Plan:    · Fever / fatigue / body aches / cough x1 month: CT chest reveals scattered dependent debris noted in the upper trachea  Variable mild bronchial wall thickening  Correlate for bronchial airways disease  No findings for focal infiltrate  Presented with elevated WBC 21 and elevated neutrophils of 16, low-grade fever of 99 9  Procalcitonin still pending as patient is a poor stick  Influenza A/B/RSV PCR negative  Blood cultures and urine antigens Legionella strep pneumonia pending  Started on ceftriaxone and azithro in ED which will now be discontinued given procalcitonin negative    -leukocytosis improving to 12 33   -has remained without fever    -continue supportive care, Mucinex, IV fluids     · Chest pressure:  Central chest pressure along with generalized body aches  Denies history of any medical problems or taking any medication on a daily basis  No hypertension, hyperlipidemia, or diabetes  No history of CAD or stenting  Will obtain troponin to ensure this is not  Troponin was negative  lipid panel without elevated cholesterol  A1c pending  Appears urine drug screen is positive for opiates and THC      · Leukocytosis:  WBC 21 07  Will continue to trend on IV antibiotics listed above  Obtain urinalysis given none obtained on admission  Patient does note she has had some urgency and foul-smelling urine over the past few days   -leukocytosis improved today and is currently 12 33     · Hyponatremia:  Sodium 134  Will monitor on gentle IV fluids    Await repeat CMP     · Hypotension:  Presenting with low blood pressures systolically in the 90s  Received 1 L fluid bolus x2 in ED  Will continue with IV fluids at 125 mL/hour    -still with low blood pressure is 90/44, 85/61    -will administer a 1 L fluid bolus now      · Headache:  Complains of a posterior headache that is described as pounding and patient is complains of being very sensitive to light  Will provide headache cocktail with Toradol, Reglan, Benadryl  P r n  Tylenol  No nucal rigidity and neuro exam within normal limits  This has improved status post cocktail      · Tobacco abuse:  Smokes 1/2 pack to 1 pack daily  Counseled on cessation  On the patch  · Substance abuse:  Found to have positive opioids and positive THC and rapid urine drug screen  VTE Pharmacologic Prophylaxis:   Pharmacologic: Enoxaparin (Lovenox)  Mechanical VTE Prophylaxis in Place: Yes    Discharge Plan:  [facility] tomorrow once blood pressures have improved  Continued admission given hypotension  Discussions with Specialists or Other Care Team Provider:  Dr Deborah Rose    Education and Discussions with Family / Patient:  Patient    Time Spent for Care: 20 minutes  More than 50% of total time spent on counseling and coordination of care as described above  Current Length of Stay: 0 day(s)  Current Patient Status: Observation   Code Status: Level 1 - Full Code    Subjective:   Resting in bed  Feels slightly better compared to yesterday on IV fluids  Urine pending  Still with cough but reports she is able to bring more mucus up  Vomited once last night but was able to tolerate a light breakfast today  Still with chest pressure worsened with coughing  Objective:     Vitals:   Temp (24hrs), Av 9 °F (37 2 °C), Min:98 5 °F (36 9 °C), Max:99 9 °F (37 7 °C)    Temp:  [98 5 °F (36 9 °C)-99 9 °F (37 7 °C)] 98 7 °F (37 1 °C)  HR:  [75-95] 75  Resp:  [16-18] 18  BP: ()/(44-61) 90/44  SpO2:  [95 %-100 %] 99 %  Body mass index is 25 06 kg/m²       Input and Output Summary (last 24 hours): Intake/Output Summary (Last 24 hours) at 01/08/19 1024  Last data filed at 01/08/19 0620   Gross per 24 hour   Intake          3141 67 ml   Output                0 ml   Net          3141 67 ml       Physical Exam:     Physical Exam   Constitutional: She is oriented to person, place, and time  She appears well-developed and well-nourished  No distress  HENT:   Head: Normocephalic and atraumatic  Eyes: Conjunctivae are normal    Cardiovascular: Normal rate, regular rhythm and normal heart sounds  Pulmonary/Chest: Effort normal and breath sounds normal  No respiratory distress  She has no wheezes  She has no rales  She exhibits no tenderness  Abdominal: Soft  Bowel sounds are normal  She exhibits no distension and no mass  There is no tenderness  There is no rebound and no guarding  Neurological: She is alert and oriented to person, place, and time  Skin: Skin is warm and dry  She is not diaphoretic  Psychiatric: She has a normal mood and affect  Her behavior is normal    Nursing note and vitals reviewed  Additional Data:     Labs:      Results from last 7 days  Lab Units 01/08/19  0914   WBC Thousand/uL 12 33*   HEMOGLOBIN g/dL 10 3*   HEMATOCRIT % 32 3*   PLATELETS Thousands/uL 200   NEUTROS PCT % 71   LYMPHS PCT % 18   MONOS PCT % 10   EOS PCT % 1       Results from last 7 days  Lab Units 01/07/19  1535   POTASSIUM mmol/L 4 8   CHLORIDE mmol/L 98*   CO2 mmol/L 26   BUN mg/dL 10   CREATININE mg/dL 0 85   CALCIUM mg/dL 8 7           * I Have Reviewed All Lab Data Listed Above  * Additional Pertinent Lab Tests Reviewed:  [facility] Admission Reviewed    Imaging:    Imaging Reports Reviewed Today Include:   Imaging Personally Reviewed by Myself Includes:      Recent Cultures (last 7 days):       Results from last 7 days  Lab Units 01/07/19  1553   INFLUENZA B PCR  None Detected   RSV PCR  None Detected       Last 24 Hours Medication List:     Current Facility-Administered Medications:  acetaminophen 650 mg Oral Q6H PRN Candace Bates PA-C    enoxaparin 40 mg Subcutaneous Daily Candace Bates PA-C    guaiFENesin 600 mg Oral Q12H [facility] Candace Bates PA-C    ondansetron 4 mg Intravenous Q6H PRN Candace Bates PA-C    sodium chloride 125 mL/hr Intravenous Continuous Guero Pierre PA-C Last Rate: 125 mL/hr (01/08/19 4092)        Today, Patient Was Seen By: Guero Pierre PA-C    ** Please Note: This note has been constructed using a voice recognition system   **

## 2019-01-08 NOTE — PLAN OF CARE
CARDIOVASCULAR - ADULT     Maintains optimal cardiac output and hemodynamic stability Progressing        DISCHARGE PLANNING     Discharge to home or other facility with appropriate resources Progressing        GASTROINTESTINAL - ADULT     Minimal or absence of nausea and/or vomiting Progressing     Maintains adequate nutritional intake Progressing        HEMATOLOGIC - ADULT     Maintains hematologic stability Progressing        INFECTION - ADULT     Absence or prevention of progression during hospitalization Progressing     Absence of fever/infection during neutropenic period Progressing        Knowledge Deficit     Patient/family/caregiver demonstrates understanding of disease process, treatment plan, medications, and discharge instructions Progressing        METABOLIC, FLUID AND ELECTROLYTES - ADULT     Electrolytes maintained within normal limits Progressing     Fluid balance maintained Progressing        PAIN - ADULT     Verbalizes/displays adequate comfort level or baseline comfort level Progressing        Potential for Falls     Patient will remain free of falls Progressing        RESPIRATORY - ADULT     Achieves optimal ventilation and oxygenation Progressing        SAFETY ADULT     Maintain or return to baseline ADL function Progressing     Maintain or return mobility status to optimal level Progressing     Patient will remain free of falls Progressing

## 2019-01-08 NOTE — H&P
History and Physical - [facility]    Patient Information: Lucita Jasso 44 y o  female MRN: [id number]  Unit/Bed#: E4 -01 Encounter: [id number]  Admitting Physician: Olamide Espino PA-C  PCP: No primary care provider on file  Date of Admission:  01/07/19    Assessment/Plan:    Hospital Problem List:     Principal Problem:    Cough  Active Problems:    Leukocytosis    Fever    Fatigue    Chest pain    Hypotension    Tobacco abuse      Primary Problem(s):  · Fever / fatigue / body aches / cough x1 month  · CT chest reveals scattered dependent debris noted in the upper trachea  Variable mild bronchial wall thickening  Correlate for bronchial airways disease  No findings for focal infiltrate  · Presented with elevated WBC 21 and elevated neutrophils of 16, low-grade fever of 99 9  · Rapid influenza negative however A/B/RSV PCR pending  · Blood cultures and procalcitonin pending  · Trend procalcitonin and leukocytosis  · Check urine antigens Legionella strep pneumonia  · Started on ceftriaxone and azithro in ED which will be continued for now  Additional Problems:   · Chest pressure:  Central chest pressure along with generalized body aches  Denies history of any medical problems or taking any medication on a daily basis  No hypertension, hyperlipidemia, or diabetes  No history of CAD or stenting  Will obtain troponin to ensure this is not  Check lipid panel and hemoglobin A1c  Also obtain urine drug screen  · Leukocytosis:  WBC 21 07  Will continue to trend on IV antibiotics listed above  Will obtain urinalysis given none obtained on admission  Patient does note she has had some urgency and foul-smelling urine over the past few days  · Hyponatremia:  Sodium 134  Will monitor on gentle IV fluids    · Hypotension:  Presenting with low blood pressures systolically in the 97U  Received 1 L fluid bolus x2 in ED    Will continue with IV fluids at 125 mL/hour  · Headache:  Complains of a posterior headache that is described as pounding and patient is complains of being very sensitive to light  Will provide headache cocktail with Toradol, Reglan, Benadryl  P r n  Tylenol  No nucal rigidity and neuro exam within normal limits  · Tobacco abuse:  Smokes 1/2 pack to 1 pack daily  Counseled on cessation  On the patch  VTE Prophylaxis: Enoxaparin (Lovenox)  / sequential compression device   Code Status: full code  Anticipated Length of Stay:  Patient will be admitted on an Observation basis with an anticipated length of stay of  Less than 2 midnights  Justification for Hospital Stay: fever, fatigue, body aches with need for further supportive care, fluid hydration and workup even significant leukocytosis  Chief Complaint:   Fever / chest pressure / body aches    History of Present Illness:    Maxx Tolbert is a 44 y o  female with no significant past medical history and does not take medications on a daily basis  She presents with symptoms consisting of nonproductive cough, intermittent fevers and chills, sweats, generalized weakness, fatigue, and chest pressure  No radiation with chest pressure  No shortness of breath  Patient notes symptoms have been intermittent for the past month  She works in a [employer] is sick  Poor appetite and has not eaten anything today  Current smoker 1/2 to 1 ppd  Denies alcohol use or other drug use  Did not receive the flu shot ever in her life  Review of Systems:    General:   + Fever or chills; generalized weakness and fatigue   EENT:   No ear pain, facial swelling; No sneezing, sore throat  Skin:   No rashes, color changes  Respiratory:     No shortness of breath, cough, wheezing, stridor  Cardiovascular:     No chest pain, palpitations  Gastrointestinal:    No nausea, vomiting, diarrhea; No abdominal pain     Musculoskeletal:     No arthralgias, myalgias, swelling  + headache Neurologic:   No dizziness, numbness, weakness  No speech difficulties  Psych:   No agitation,     Otherwise, All other twelve-point review of systems normal      Past Medical and Surgical History:     Past Medical History:   Diagnosis Date    Ectopic pregnancy     Pneumonia        History reviewed  No pertinent surgical history  Meds/Allergies:    Current Facility-Administered Medications   Medication Dose Route Frequency Provider Last Rate Last Dose    acetaminophen (TYLENOL) tablet 650 mg  650 mg Oral Q6H PRN Candace Bates PA-C        azithromycin (ZITHROMAX) 500 mg in sodium chloride 0 9% 250mL IVPB 500 mg  500 mg Intravenous Once Bessie Alexander DO        diphenhydrAMINE (BENADRYL) tablet 25 mg  25 mg Oral Once Nolan Locke PA-C        [START ON 1/8/2019] enoxaparin (LOVENOX) subcutaneous injection 40 mg  40 mg Subcutaneous Daily Candace Bates PA-C        guaiFENesin (MUCINEX) 12 hr tablet 600 mg  600 mg Oral Q12H [address] Candace Bates PA-C        ketorolac (TORADOL) injection 15 mg  15 mg Intravenous Once Nolan Locke PA-C        metoclopramide (REGLAN) injection 10 mg  10 mg Intravenous Once Nolan Locke PA-C        ondansetron [facility]) injection 4 mg  4 mg Intravenous Q6H PRN Nolan Locke PA-C        sodium chloride 0 9 % infusion  125 mL/hr Intravenous Continuous Nolan Locke PA-C 125 mL/hr at 01/07/19 2112 125 mL/hr at 01/07/19 2112       Allergies   Allergen Reactions    Penicillins        Allergies:    Allergies   Allergen Reactions    Penicillins        Social History:     Marital Status: Significant Other     Substance Use History:   History   Alcohol Use    Yes     Comment: occasionally     History   Smoking Status    Current Every Day Smoker    Packs/day: 0 50   Smokeless Tobacco    Never Used     History   Drug Use No       Family History:    non-contributory    Physical Exam:     Vitals:   Blood Pressure: 91/58 (01/07/19 1942)  Pulse: 81 (01/07/19 1942)  Temperature: 98 5 °F (36 9 °C) (01/07/19 1942)  Temp Source: Temporal (01/07/19 1942)  Respirations: 18 (01/07/19 1942)  Height: 5' (152 4 cm) (01/07/19 1942)  Weight - Scale: 58 2 kg (128 lb 4 9 oz) (01/07/19 1942)  SpO2: 100 % (01/07/19 1942)    Physical Exam   Constitutional: She is oriented to person, place, and time  She appears well-developed and well-nourished  No distress  HENT:   Head: Normocephalic and atraumatic  Eyes: Pupils are equal, round, and reactive to light  Conjunctivae are normal    Cardiovascular: Normal rate, regular rhythm and normal heart sounds  Pulmonary/Chest: No respiratory distress  She has no wheezes  She has no rales  She exhibits no tenderness  Decreased breath sounds bilaterally   Abdominal: Soft  Bowel sounds are normal  She exhibits no distension and no mass  There is no tenderness  There is no rebound and no guarding  Neurological: She is alert and oriented to person, place, and time  No cranial nerve deficit  Coordination normal    No nuchal rigidity  Neuro exam intact  Skin: Skin is warm and dry  She is not diaphoretic  Psychiatric: She has a normal mood and affect  Her behavior is normal    Nursing note and vitals reviewed  Additional Data:     Lab Results: I have personally reviewed pertinent reports  Results from last 7 days  Lab Units 01/07/19  1535   WBC Thousand/uL 21 07*   HEMOGLOBIN g/dL 13 0   HEMATOCRIT % 40 2   PLATELETS Thousands/uL 269   NEUTROS PCT % 80*   LYMPHS PCT % 10*   MONOS PCT % 9   EOS PCT % 0       Results from last 7 days  Lab Units 01/07/19  1535   POTASSIUM mmol/L 4 8   CHLORIDE mmol/L 98*   CO2 mmol/L 26   BUN mg/dL 10   CREATININE mg/dL 0 85   CALCIUM mg/dL 8 7           Imaging: I have personally reviewed pertinent reports  Xr Chest 2 Views    Result Date: 1/7/2019  Narrative: CHEST INDICATION:   Chest pain   COMPARISON:  9/10/2017 EXAM PERFORMED/VIEWS:  XR CHEST PA & LATERAL FINDINGS: Cardiomediastinal silhouette appears unremarkable  The lungs are clear  No pneumothorax or pleural effusion  Osseous structures appear within normal limits for patient age  Impression: No acute cardiopulmonary disease  Workstation performed: [id number]     Ct Chest Without Contrast    Result Date: 1/7/2019  Narrative: CT CHEST WITHOUT IV CONTRAST INDICATION:   Intermittent fevers, fatigue, body ache, chest pain  COMPARISON:  CT chest of 9/10/2017, chest x-ray 1/7/2019 TECHNIQUE: CT examination of the chest was performed without intravenous contrast   Axial, sagittal, and coronal 2D reformatted images were created from the source data and submitted for interpretation  Radiation dose length product (DLP) for this visit:  164 mGy-cm   This examination, like all CT scans performed in the [facility], was performed utilizing techniques to minimize radiation dose exposure, including the use of iterative reconstruction and automated exposure control  FINDINGS: LUNGS:  Scattered dependent debris noted in the upper trachea  Variable mild bronchial wall thickening  Mild subsegmental atelectasis in the bilateral lower lobes  Minimal linear atelectasis versus scarring in the right upper lobe posteriorly  Tiny calcified granuloma noted in the left upper lobe laterally  PLEURA:  Unremarkable  HEART/GREAT VESSELS:  Unremarkable for patient's age  MEDIASTINUM AND JAMSHID:  Unremarkable  CHEST WALL AND LOWER NECK:   Unremarkable  VISUALIZED STRUCTURES IN THE UPPER ABDOMEN:  Unremarkable  OSSEOUS STRUCTURES:  No acute fracture or destructive osseous lesion  Impression: 1  Scattered dependent debris noted in the upper trachea  Variable mild bronchial wall thickening  Correlate for bronchial airways disease  No findings for focal infiltrate  The study was marked in EPIC for significant notification   Workstation performed: [id number]       EKG, Pathology, and Other Studies Reviewed on Admission:   · EKG: please refer to scanned copy on chart    [employer] Records Reviewed: Yes     Total Time for Visit, including Counseling / Coordination of Care: 45 minutes  Greater than 50% of this total time spent on direct patient counseling and coordination of care  ** Please Note: This note has been constructed using a voice recognition system   **

## 2019-01-08 NOTE — UTILIZATION REVIEW
Initial Clinical Review    Admission: Date/Time/Statement:  OBS 1/7 @ 1840    Orders Placed This Encounter   Procedures    Place in Observation (expected length of stay for this patient is less than two midnights)     Standing Status:   Standing     Number of Occurrences:   1     Order Specific Question:   Admitting Physician     Answer:   Germain Hernandez     Order Specific Question:   Level of Care     Answer:   Med Surg [16]       ED: Date/Time/Mode of Arrival:   ED Arrival Information     Expected Arrival Acuity Means of Arrival Escorted By Service Admission Type    - 1/7/2019 15:04 Urgent Walk-In Self General Medicine Urgent    Arrival Complaint    Chest pain        Chief Complaint:   Chief Complaint   Patient presents with    Chest Pain     Pt reports being sick for the past three weeks  Pt reports intermittent fevers/fatigue/bodyavhes/   Pt reports sharp chest pain since last night  Pt states pain increases with deep breath and movement        History of Illness: 67-year-old female with a history of pneumonia presents with worsening of her URI symptoms over the last 3 days and right-sided chest pain  Patient states that she has been sick over [place]  She was diagnosed clinically with influenza and was starting to feel better until 3 days ago when she states she developed fevers up to 103 and cough sometimes productive of yellow sputum  She had 2 episodes of vomiting today  No diarrhea  No known ill contacts or recent travel      ED Vital Signs:   ED Triage Vitals   Temperature Pulse Respirations Blood Pressure SpO2   01/07/19 1509 01/07/19 1509 01/07/19 1509 01/07/19 1509 01/07/19 1509   99 9 °F (37 7 °C) 79 16 105/60 99 %      Temp Source Heart Rate Source Patient Position - Orthostatic VS BP Location FiO2 (%)   01/07/19 1509 01/07/19 1509 01/07/19 1509 01/07/19 1509 --   Oral Monitor Sitting Right arm       Pain Score       01/07/19 1536       9        Wt Readings from Last 1 Encounters: 01/07/19 58 2 kg (128 lb 4 9 oz)       Vital Signs (abnormal):   01/07/19 1942 98 5 °F (36 9 °C) 81 18 91/58 -- 58 2 kg (128 lb 4 9 oz) MM   01/07/19 1926 -- 79 18 97/54 100 % -- KR   01/07/19 1827 -- -- -- 91/55 -- -- KR   01/07/19 1819 -- 95 16  85/50 95 % -- KR   01/07/19 1706 98 7 °F (37 1 °C) 77 16 90/55 97 % -- KR   01/07/19 1509 99 9 °F (37 7 °C) 79 16 105/60 99 % -- KJ   01/07/19 1508 -- -- -- -- -- 54 4 kg (120 lb) KJ       Abnormal Labs/Diagnostic Test Results:  Na 134,   Cl 98  WBC's 21 07,   Abs monos 1 98   BC's pending  Rapid Flu  Negative  Trop < 0 02  CXR: No acute cardiopulmonary disease  CT Chest: 1   Scattered dependent debris noted in the upper trachea   Variable mild bronchial wall thickening    Correlate for bronchial airways disease   No findings for focal infiltrate  ED Treatment:   Medication Administration from 01/07/2019 1504 to 01/07/2019 1946       Date/Time Order Dose Route Action Action by Comments     01/07/2019 1706 sodium chloride 0 9 % bolus 1,000 mL 0 mL Intravenous Stopped       01/07/2019 1537 sodium chloride 0 9 % bolus 1,000 mL 1,000 mL Intravenous New Bag       01/07/2019 1536 ketorolac (TORADOL) injection 15 mg 15 mg Intravenous Given       01/07/2019 1926 ceftriaxone (ROCEPHIN) 1 g/50 mL in dextrose IVPB 1,000 mg Intravenous New Bag       01/07/2019 1852 sodium chloride 0 9 % bolus 1,000 mL 1,000 mL Intravenous New Bag       01/07/2019 1850 morphine injection 2 mg 2 mg Intravenous Given            Past Medical/Surgical History:   Past Medical History:   Diagnosis Date    Ectopic pregnancy     Pneumonia        Admitting Diagnosis: Acute bronchitis [J20 9]  Leukocytosis [D72 829]  Chest pain [R07 9]    Age/Sex: 44 y o  female     Assessment/Plan:  43 yo female with Chest pain, fever, fatigue, body aches, cough, elevated WBC's  CT chest reveals scattered dependent debris noted in the upper trachea  Variable mild bronchial wall thickening   Correlate for bronchial airways disease  No findings for focal infiltrate  Rapid influenza negative however A/B/RSV PCR pending  Blood cultures and procalcitonin pending  Trend procalcitonin and leukocytosis  Check urine antigens Legionella strep pneumonia  Obtain urinalysis as Patient  notes she has had some urgency and foul-smelling urine over the past few days  Check Trop  Continue IVF's for Hypotension  Admission Orders:  OBS  Scheduled Meds:   Current Facility-Administered Medications:  acetaminophen 650 mg Oral Q6H PRN Candace Bates PA-C    enoxaparin 40 mg Subcutaneous Daily Candace Bates PA-C    guaiFENesin 600 mg Oral Q12H [facility] Candace Bates PA-C    ondansetron 4 mg Intravenous Q6H PRN Candace Bates PA-C              Continuous Infusions:   sodium chloride 125 mL/hr Last Rate: 125 mL/hr (01/08/19 0399)     PRN Meds:   acetaminophen    Ondansetron    Toradol IV x 1  Reglan IV X 1    Urine Legionella & Strep Pneumoniae,  Rapid Drug,  Trop,  Urine with Cx  SCD's  BC, CMP, Procalcitonin in am    1/8: 98 7 - 75 - 18   85/61  Labs:    WBC's 12 33,     H&H 10 3 / 32 3,    FBS  132,     Ca 7 7,   Alb 2 3  Urine drug screen is positive for opiates and THC  Urine: small blood,  2-4 wbc's,  occ bacteria    Still with low blood pressure is 90/44, 85/61,  84/56  Bolus 1 liter IVF's  Cont IVF @ 125 / h   Tylenol x 1  Continues with cough - able to bring up more mucous  chest pressure worsened with coughing        [address] Utilization Review Department  Phone: [phone]; Fax [phone]  [email or web address]  ATTENTION: Please call with any questions or concerns to [phone]  and carefully listen to the prompts so that you are directed to the right person  Send all requests for admission clinical reviews, approved or denied determinations and any other requests to fax [phone]   All voicemails are confidential

## 2019-01-09 VITALS
HEART RATE: 68 BPM | HEIGHT: 60 IN | WEIGHT: 128.31 LBS | OXYGEN SATURATION: 95 % | RESPIRATION RATE: 18 BRPM | TEMPERATURE: 97.5 F | BODY MASS INDEX: 25.19 KG/M2 | DIASTOLIC BLOOD PRESSURE: 64 MMHG | SYSTOLIC BLOOD PRESSURE: 117 MMHG

## 2019-01-09 PROBLEM — R53.83 FATIGUE: Status: RESOLVED | Noted: 2019-01-07 | Resolved: 2019-01-09

## 2019-01-09 PROBLEM — R50.9 FEVER: Status: RESOLVED | Noted: 2019-01-07 | Resolved: 2019-01-09

## 2019-01-09 PROBLEM — D72.829 LEUKOCYTOSIS: Status: RESOLVED | Noted: 2019-01-07 | Resolved: 2019-01-09

## 2019-01-09 PROBLEM — R07.9 CHEST PAIN: Status: RESOLVED | Noted: 2019-01-07 | Resolved: 2019-01-09

## 2019-01-09 PROBLEM — I95.9 HYPOTENSION: Status: RESOLVED | Noted: 2019-01-07 | Resolved: 2019-01-09

## 2019-01-09 PROBLEM — D64.9 ANEMIA: Status: ACTIVE | Noted: 2019-01-09

## 2019-01-09 LAB
ANION GAP SERPL CALCULATED.3IONS-SCNC: 9 MMOL/L (ref 4–13)
ATRIAL RATE: 85 BPM
BUN SERPL-MCNC: 5 MG/DL (ref 5–25)
CALCIUM SERPL-MCNC: 7 MG/DL (ref 8.3–10.1)
CHLORIDE SERPL-SCNC: 112 MMOL/L (ref 100–108)
CO2 SERPL-SCNC: 20 MMOL/L (ref 21–32)
CREAT SERPL-MCNC: 0.63 MG/DL (ref 0.6–1.3)
ERYTHROCYTE [DISTWIDTH] IN BLOOD BY AUTOMATED COUNT: 12.8 % (ref 11.6–15.1)
FERRITIN SERPL-MCNC: 278 NG/ML (ref 8–388)
GFR SERPL CREATININE-BSD FRML MDRD: 111 ML/MIN/1.73SQ M
GLUCOSE P FAST SERPL-MCNC: 84 MG/DL (ref 65–99)
GLUCOSE SERPL-MCNC: 84 MG/DL (ref 65–140)
HCT VFR BLD AUTO: 29.3 % (ref 34.8–46.1)
HGB BLD-MCNC: 9.5 G/DL (ref 11.5–15.4)
IRON SATN MFR SERPL: 16 %
IRON SERPL-MCNC: 35 UG/DL (ref 50–170)
MCH RBC QN AUTO: 32.5 PG (ref 26.8–34.3)
MCHC RBC AUTO-ENTMCNC: 32.4 G/DL (ref 31.4–37.4)
MCV RBC AUTO: 100 FL (ref 82–98)
P AXIS: 59 DEGREES
PLATELET # BLD AUTO: 175 THOUSANDS/UL (ref 149–390)
PMV BLD AUTO: 10.3 FL (ref 8.9–12.7)
POTASSIUM SERPL-SCNC: 3.6 MMOL/L (ref 3.5–5.3)
PR INTERVAL: 126 MS
PROCALCITONIN SERPL-MCNC: <0.05 NG/ML
QRS AXIS: 60 DEGREES
QRSD INTERVAL: 76 MS
QT INTERVAL: 364 MS
QTC INTERVAL: 433 MS
RBC # BLD AUTO: 2.92 MILLION/UL (ref 3.81–5.12)
SODIUM SERPL-SCNC: 141 MMOL/L (ref 136–145)
T WAVE AXIS: 61 DEGREES
TIBC SERPL-MCNC: 219 UG/DL (ref 250–450)
VENTRICULAR RATE: 85 BPM
WBC # BLD AUTO: 9.82 THOUSAND/UL (ref 4.31–10.16)

## 2019-01-09 PROCEDURE — 82728 ASSAY OF FERRITIN: CPT | Performed by: PHYSICIAN ASSISTANT

## 2019-01-09 PROCEDURE — 83540 ASSAY OF IRON: CPT | Performed by: PHYSICIAN ASSISTANT

## 2019-01-09 PROCEDURE — 85027 COMPLETE CBC AUTOMATED: CPT | Performed by: PHYSICIAN ASSISTANT

## 2019-01-09 PROCEDURE — 80048 BASIC METABOLIC PNL TOTAL CA: CPT | Performed by: PHYSICIAN ASSISTANT

## 2019-01-09 PROCEDURE — 93010 ELECTROCARDIOGRAM REPORT: CPT | Performed by: INTERNAL MEDICINE

## 2019-01-09 PROCEDURE — 99217 PR OBSERVATION CARE DISCHARGE MANAGEMENT: CPT | Performed by: FAMILY MEDICINE

## 2019-01-09 PROCEDURE — 83550 IRON BINDING TEST: CPT | Performed by: PHYSICIAN ASSISTANT

## 2019-01-09 PROCEDURE — 84145 PROCALCITONIN (PCT): CPT | Performed by: PHYSICIAN ASSISTANT

## 2019-01-09 RX ORDER — BENZONATATE 100 MG/1
100 CAPSULE ORAL 3 TIMES DAILY PRN
Status: COMPLETED | OUTPATIENT
Start: 2019-01-09 | End: 2019-01-09

## 2019-01-09 RX ORDER — BENZONATATE 100 MG/1
100 CAPSULE ORAL 3 TIMES DAILY
Qty: 60 CAPSULE | Refills: 0 | Status: ON HOLD | OUTPATIENT
Start: 2019-01-09 | End: 2021-09-27 | Stop reason: ALTCHOICE

## 2019-01-09 RX ORDER — GUAIFENESIN 600 MG
600 TABLET, EXTENDED RELEASE 12 HR ORAL EVERY 12 HOURS SCHEDULED
Qty: 30 TABLET | Refills: 0 | Status: ON HOLD | OUTPATIENT
Start: 2019-01-09 | End: 2021-09-27 | Stop reason: ALTCHOICE

## 2019-01-09 RX ADMIN — GUAIFENESIN 600 MG: 600 TABLET, EXTENDED RELEASE ORAL at 09:12

## 2019-01-09 RX ADMIN — ONDANSETRON 4 MG: 2 INJECTION INTRAMUSCULAR; INTRAVENOUS at 10:41

## 2019-01-09 RX ADMIN — ENOXAPARIN SODIUM 40 MG: 40 INJECTION SUBCUTANEOUS at 09:12

## 2019-01-09 RX ADMIN — BENZONATATE 100 MG: 100 CAPSULE ORAL at 10:41

## 2019-01-09 RX ADMIN — SODIUM CHLORIDE 125 ML/HR: 0.9 INJECTION, SOLUTION INTRAVENOUS at 02:50

## 2019-01-09 RX ADMIN — BENZONATATE 100 MG: 100 CAPSULE ORAL at 00:04

## 2019-01-09 RX ADMIN — ACETAMINOPHEN 650 MG: 325 TABLET, FILM COATED ORAL at 00:04

## 2019-01-09 NOTE — DISCHARGE SUMMARY
Discharge- Lydia Plaza 1975, 44 y o  female MRN: [id number]    Unit/Bed#: E4 -01 Encounter: [id number]    Primary Care Provider: No primary care provider on file  Date and time admitted to hospital: 1/7/2019  3:10 PM    Discharge Summary - [facility]    Patient Information: Lydia Plaza 44 y o  female MRN: [id number]  Unit/Bed#: E4 -01 Encounter: [id number]    Discharging Physician / Practitioner: Roz Loo PA-C  PCP: No primary care provider on file  Admission Date: 1/7/2019  Discharge Date: 01/09/19    Disposition:     Home    Reason for Admission: cough, leukocytosis    Discharge Diagnoses:     Principal Problem:    Cough  Active Problems:    Tobacco abuse    Anemia  Resolved Problems:    Leukocytosis    Fever    Fatigue    Chest pain    Hypotension      Consultations During Hospital Stay:  ·     Procedures Performed:     ·     Significant Findings / Test Results:     Reason For Exam     Rule out pneumonia   Study Result     CT CHEST WITHOUT IV CONTRAST     INDICATION:   Intermittent fevers, fatigue, body ache, chest pain      COMPARISON:  CT chest of 9/10/2017, chest x-ray 1/7/2019     TECHNIQUE: CT examination of the chest was performed without intravenous contrast   Axial, sagittal, and coronal 2D reformatted images were created from the source data and submitted for interpretation      Radiation dose length product (DLP) for this visit:  164 mGy-cm   This examination, like all CT scans performed in the [facility], was performed utilizing techniques to minimize radiation dose exposure, including the use of iterative   reconstruction and automated exposure control       FINDINGS:     LUNGS:  Scattered dependent debris noted in the upper trachea  Variable mild bronchial wall thickening        Mild subsegmental atelectasis in the bilateral lower lobes    Minimal linear atelectasis versus scarring in the right upper lobe posteriorly  Tiny calcified granuloma noted in the left upper lobe laterally      PLEURA:  Unremarkable      HEART/GREAT VESSELS:  Unremarkable for patient's age      MEDIASTINUM AND JAMSHID:  Unremarkable      CHEST WALL AND LOWER NECK:   Unremarkable      VISUALIZED STRUCTURES IN THE UPPER ABDOMEN:  Unremarkable      OSSEOUS STRUCTURES:  No acute fracture or destructive osseous lesion      IMPRESSION:     1   Scattered dependent debris noted in the upper trachea  Variable mild bronchial wall thickening  Correlate for bronchial airways disease  No findings for focal infiltrate         The study was marked in EPIC for significant notification      Workstation performed: [id number]     · procalcitonin negative  · uds-thc/opioids  · Legionella/strep pneumo ag negative  · bcx ntd  · ua nitrite/leukocyte negative w/sm blood    Incidental Findings:   ·      Test Results Pending at Discharge (will require follow up): · Iron panel     Outpatient Tests Requested:  · cbc    Complications:  none    Hospital Course: Josefina Branham is a 44 y o  female patient who originally presented to the hospital on 1/7/2019 with no significant past medical history and does not take medications on a daily basis  She presents with symptoms consisting of nonproductive cough, intermittent fevers and chills, sweats, generalized weakness, fatigue, and chest pressure  No radiation with chest pressure  No shortness of breath  Patient notes symptoms have been intermittent for the past month  She works in a [employer] is sick  Poor appetite and has not eaten anything today  Current smoker 1/2 to 1 ppd  Denies alcohol use or other drug use  Did not receive the flu shot ever in her life  Admission she is found to be dehydrated with mild hypertension headache  She was evaluated for viral illness as well as pneumonia  She did have leukocytosis on admission which improved IV fluid hydration and supportive care  Procalcitonin was negative arguing against bacterial infection given duration of symptoms intremittent over 6 weeks         Anemia   Assessment & Plan    Stable at 9 5-10 3   Was 13 on admission however suspect this was largely dilutional  t bili wnl  No active s/sx of bleeding  Pt does have hx of AUB, will check iron panel now prior to d/c  Needs close f/u with pcp     Tobacco abuse   Assessment & Plan    Cessation encouraged     * Cough   Assessment & Plan    Likely 2* viral LRT infection  CT chest w/o contrastScattered dependent debris noted in the upper trachea  Variable mild bronchial wall thickening  Correlate for bronchial airways disease  No findings for focal infiltrate  Pt was w/leukocytosis of 21 on admission improved w/supportive care and 1 dose of cap abx  procalcitonin wnl which argues against bacterial LRT infection, flu/rsv negative/legionella/strep pneumo ag negative, bcx ntd, ua bland  rx written for tessalon perles 100mg tid, and mucinex  Op f/u with pcp for transition of care     Hypotensionresolved as of 1/9/2019   Assessment & Plan    Relative  Pt asymptomatic  Improved w/IVF hydration  No further workup at this time     Chest painresolved as of 1/9/2019   Assessment & Plan    2* viral LRT infection  CT chest w/o contrastdemonstrates   Scattered dependent debris noted in the upper trachea  Variable mild bronchial wall thickening  Correlate for bronchial airways disease  No findings for focal infiltrate  troponins negative for acs     Leukocytosisresolved as of 1/9/2019   Assessment & Plan    Neutrophilic leukocytosis of 21 poa now resolved  Workup as above  Op f/u with pcp           Condition at Discharge: good     Discharge Day Visit / Exam:     Subjective:  Pt seen and examined  She had episode of small volume hemoptysis now resolved  She reports her biggest issue is her cough which keeps her up awake and causes her to have chest and upper back pain    Cough is productive of green sputum  No sob  Denies hx of substance, alcohol abuse in regards to concern for possible aspiration  Denies any black stools or brbpr but notes a hx of heavy periods  No known hx of anemia  Vitals: Blood Pressure: 117/64 (01/09/19 0700)  Pulse: 68 (01/09/19 0700)  Temperature: 97 5 °F (36 4 °C) (01/09/19 0700)  Temp Source: Tympanic (01/09/19 0700)  Respirations: 18 (01/09/19 0700)  Height: 5' (152 4 cm) (01/07/19 1942)  Weight - Scale: 58 2 kg (128 lb 4 9 oz) (01/07/19 1942)  SpO2: 95 % (01/09/19 0700)  Exam:   Physical Exam   Constitutional: She appears well-developed and well-nourished  No distress  HENT:   Head: Normocephalic and atraumatic  Right Ear: External ear normal    Left Ear: External ear normal    Eyes: Conjunctivae are normal  Right eye exhibits no discharge  Left eye exhibits no discharge  No scleral icterus  Neck: Normal range of motion  Cardiovascular: Normal rate, regular rhythm and normal heart sounds  Exam reveals no gallop and no friction rub  No murmur heard  Pulmonary/Chest: Effort normal and breath sounds normal  No respiratory distress  She has no wheezes  She has no rales  She exhibits tenderness  She is on room air, she is able to talk in full sentences without any conversational dyspnea, work of breathing  She is lying supine on arrival to the room   Abdominal: Soft  She exhibits no distension  There is no tenderness  There is no rebound and no guarding  Musculoskeletal: She exhibits no edema  Skin: Skin is dry  She is not diaphoretic  Psychiatric: She has a normal mood and affect  Vitals reviewed  (  Be Sure to Include Physical Exam: Delete this entire line when you have entered your exam)  Discussion with Family:     Discharge instructions/Information to patient and family:   See after visit summary for information provided to patient and family        Provisions for Follow-Up Care:  See after visit summary for information related to follow-up care and any pertinent home health orders  Planned Readmission:      Discharge Statement:  I spent 40 minutes discharging the patient  This time was spent on the day of discharge  I had direct contact with the patient on the day of discharge  Greater than 50% of the total time was spent examining patient, answering all patient questions, arranging and discussing plan of care with patient as well as directly providing post-discharge instructions  Additional time then spent on discharge activities  Discharge Medications:  See after visit summary for reconciled discharge medications provided to patient and family        ** Please Note: This note has been constructed using a voice recognition system **

## 2019-01-09 NOTE — DISCHARGE INSTRUCTIONS
Please follow up with a primary care provider to discuss transition of care and further management of your lower respiratory tract infection and anemia as well to discuss the test results regarding your iron panel  You were evaluated for possible pneumonia and bronchitis  You were found to have evidence of dependent debris in your airways and some mild thickening concerning for viral lower respiratory tract infection  You were assessed by blood infection, urine infection and for evidence of bacterial lung infection which were ruled out  Acute Cough   WHAT YOU NEED TO KNOW:   An acute cough can last up to 3 weeks  Common causes of an acute cough include a cold, allergies, or a lung infection  DISCHARGE INSTRUCTIONS:   Return to the emergency department if:   · You have trouble breathing or feel short of breath  · You cough up blood, or you see blood in your mucus  · You faint or feel weak or dizzy  · You have chest pain when you cough or take a deep breath  · You have new wheezing  Contact your healthcare provider if:   · You have a fever  · Your cough lasts longer than 4 weeks  · Your symptoms do not improve with treatment  · You have questions or concerns about your condition or care  Medicines:   · Medicines  may be needed to stop the cough, decrease swelling in your airways, or help open your airways  Medicine may also be given to help you cough up mucus  Ask your healthcare provider what over-the-counter medicines you can take  If you have an infection caused by bacteria, you may need antibiotics  · Take your medicine as directed  Contact your healthcare provider if you think your medicine is not helping or if you have side effects  Tell him or her if you are allergic to any medicine  Keep a list of the medicines, vitamins, and herbs you take  Include the amounts, and when and why you take them  Bring the list or the pill bottles to follow-up visits   Carry your medicine list with you in case of an emergency  Manage your symptoms:   · Do not smoke and stay away from others who smoke  Nicotine and other chemicals in cigarettes and cigars can cause lung damage and make your cough worse  Ask your healthcare provider for information if you currently smoke and need help to quit  E-cigarettes or smokeless tobacco still contain nicotine  Talk to your healthcare provider before you use these products  · Drink extra liquids as directed  Liquids will help thin and loosen mucus so you can cough it up  Liquids will also help prevent dehydration  Examples of good liquids to drink include water, fruit juice, and broth  Do not drink liquids that contain caffeine  Caffeine can increase your risk for dehydration  Ask your healthcare provider how much liquid to drink each day  · Rest as directed  Do not do activities that make your cough worse, such as exercise  · Use a humidifier or vaporizer  Use a cool mist humidifier or a vaporizer to increase air moisture in your home  This may make it easier for you to breathe and help decrease your cough  · Eat 2 to 5 mL of honey 2 times each day  Honey can help thin mucus and decrease your cough  · Use cough drops or lozenges  These can help decrease throat irritation and your cough  Follow up with your healthcare provider as directed:  Write down your questions so you remember to ask them during your visits  © 2017 [employer]  Information is for End User's use only and may not be sold, redistributed or otherwise used for commercial purposes  All illustrations and images included in CareNotes® are the copyrighted property of [employer] M [employer] Inc  or [employer]iki Jackson  The above information is an  only  It is not intended as medical advice for individual conditions or treatments   Talk to your doctor, nurse or pharmacist before following any medical regimen to see if it is safe and effective for you

## 2019-01-09 NOTE — ASSESSMENT & PLAN NOTE
Likely 2* viral LRT infection  CT chest w/o contrastScattered dependent debris noted in the upper trachea  Variable mild bronchial wall thickening  Correlate for bronchial airways disease    No findings for focal infiltrate  Pt was w/leukocytosis of 21 on admission improved w/supportive care and 1 dose of cap abx  procalcitonin wnl which argues against bacterial LRT infection, flu/rsv negative/legionella/strep pneumo ag negative, bcx ntd, ua bland  rx written for tessalon perles 100mg tid, and mucinex  Op f/u with pcp for transition of care

## 2019-01-09 NOTE — NURSING NOTE
Patient had IV removed and instructions had been removed  Information was given re: smoking and her spouse came to pick her up  She ambulated to the lobby

## 2019-01-09 NOTE — ASSESSMENT & PLAN NOTE
2* viral LRT infection  CT chest w/o contrastdemonstrates   Scattered dependent debris noted in the upper trachea  Variable mild bronchial wall thickening  Correlate for bronchial airways disease    No findings for focal infiltrate  troponins negative for acs

## 2019-01-09 NOTE — ASSESSMENT & PLAN NOTE
Stable at 9 5-10 3   Was 13 on admission however suspect this was largely dilutional  t bili wnl   No active s/sx of bleeding  Pt does have hx of AUB, will check iron panel now prior to d/c  Needs close f/u with pcp

## 2019-01-09 NOTE — UTILIZATION REVIEW
Continued Stay Review    Date:  1/9/2019    Vital Signs: /64 (BP Location: Right arm)   Pulse 68   Temp 97 5 °F (36 4 °C) (Tympanic)   Resp 18   Ht 5' (1 524 m)   Wt 58 2 kg (128 lb 4 9 oz)   LMP 12/01/2018   SpO2 95%   BMI 25 06 kg/m²        At 2300 1/8:  99 7 - 64 - 18   112/66    Assessment/Plan: Written for DC   She is on room air, she is able to talk in full sentences without any conversational dyspnea, work of breathing  She is lying supine on arrival to the room       Medications:   Scheduled Meds:   Current Facility-Administered Medications:  acetaminophen 650 mg Oral Q6H PRN  x 1    enoxaparin 40 mg Subcutaneous Daily     guaiFENesin 600 mg Oral Q12H [facility]  x 1    ondansetron 4 mg Intravenous Q6H PRN X 1              Continuous Infusions:   sodium chloride 125 mL/hr Last Rate: 125 mL/hr (01/09/19 0250)     Pertinent Labs/Diagnostic Results:   Cl 112,   CO2  20  Ca 7 0  H&H 9 5 / 29 3  Procalcitonin < 0 05    Discharge Plan:  Written for DC 1/9 @ [address] Utilization Review Department  Phone: [phone]; Fax [phone]  [email or web address]  ATTENTION: Please call with any questions or concerns to [phone]  and carefully listen to the prompts so that you are directed to the right person  Send all requests for admission clinical reviews, approved or denied determinations and any other requests to fax [phone]   All voicemails are confidential

## 2019-01-09 NOTE — NURSING NOTE
Pt c/o unrelieved cough with chest & back pain d/t cough  Offered PRN Tylenol for pain  Pt was not interested and requesting something else for pain, as well as something to relieve cough  Spoke with RRNP  Orders received for 1 time dose benzonatate  Told to offer Tylenol and call RRNP if pt still c/o pain  Will continue to monitor

## 2019-01-13 LAB
BACTERIA BLD CULT: NORMAL
BACTERIA BLD CULT: NORMAL

## 2019-01-15 LAB
EST. AVERAGE GLUCOSE BLD GHB EST-MCNC: 105 MG/DL
HBA1C MFR BLD: 5.3 % (ref 4.2–6.3)

## 2021-04-01 ENCOUNTER — TRANSCRIBE ORDERS (OUTPATIENT)
Dept: ADMINISTRATIVE | Facility: HOSPITAL | Age: 46
End: 2021-04-01

## 2021-04-01 DIAGNOSIS — I82.409 ACUTE EMBOLISM AND THROMBOSIS OF UNSPECIFIED DEEP VEINS OF UNSPECIFIED LOWER EXTREMITY (HCC): Primary | ICD-10-CM

## 2021-04-02 ENCOUNTER — HOSPITAL ENCOUNTER (OUTPATIENT)
Dept: NON INVASIVE DIAGNOSTICS | Facility: HOSPITAL | Age: 46
Discharge: HOME | End: 2021-04-02
Attending: PODIATRIST
Payer: COMMERCIAL

## 2021-04-02 DIAGNOSIS — I82.409 ACUTE EMBOLISM AND THROMBOSIS OF UNSPECIFIED DEEP VEINS OF UNSPECIFIED LOWER EXTREMITY (HCC): ICD-10-CM

## 2021-04-02 PROCEDURE — 93971 EXTREMITY STUDY: CPT | Performed by: SURGERY

## 2021-04-02 PROCEDURE — 93971 EXTREMITY STUDY: CPT

## 2021-08-27 ENCOUNTER — PREP FOR PROCEDURE (OUTPATIENT)
Dept: OBGYN CLINIC | Facility: OTHER | Age: 46
End: 2021-08-27

## 2021-08-27 DIAGNOSIS — M67.874 OTHER SPECIFIED DISORDERS OF TENDON, LEFT ANKLE AND FOOT: Primary | ICD-10-CM

## 2021-09-27 ENCOUNTER — ANESTHESIA (OUTPATIENT)
Dept: PERIOP | Facility: HOSPITAL | Age: 46
End: 2021-09-27
Payer: COMMERCIAL

## 2021-09-27 ENCOUNTER — HOSPITAL ENCOUNTER (OUTPATIENT)
Facility: HOSPITAL | Age: 46
Setting detail: OUTPATIENT SURGERY
Discharge: HOME | End: 2021-09-27
Attending: PODIATRIST | Admitting: PODIATRIST
Payer: COMMERCIAL

## 2021-09-27 ENCOUNTER — HOSPITAL ENCOUNTER (OUTPATIENT)
Dept: RADIOLOGY | Facility: HOSPITAL | Age: 46
Discharge: HOME | End: 2021-09-27
Payer: COMMERCIAL

## 2021-09-27 ENCOUNTER — ANESTHESIA EVENT (OUTPATIENT)
Dept: PERIOP | Facility: HOSPITAL | Age: 46
End: 2021-09-27
Payer: COMMERCIAL

## 2021-09-27 VITALS
HEART RATE: 93 BPM | RESPIRATION RATE: 15 BRPM | HEIGHT: 60 IN | OXYGEN SATURATION: 97 % | TEMPERATURE: 98.4 F | SYSTOLIC BLOOD PRESSURE: 136 MMHG | WEIGHT: 154 LBS | BODY MASS INDEX: 30.23 KG/M2 | DIASTOLIC BLOOD PRESSURE: 84 MMHG

## 2021-09-27 DIAGNOSIS — M67.874 OTHER SPECIFIED DISORDERS OF TENDON, LEFT ANKLE AND FOOT: ICD-10-CM

## 2021-09-27 DIAGNOSIS — Z98.890 POST-OPERATIVE STATE: Primary | ICD-10-CM

## 2021-09-27 PROBLEM — F11.11 NARCOTIC ABUSE IN REMISSION (HCC): Status: ACTIVE | Noted: 2021-09-27

## 2021-09-27 LAB
EXT PREGNANCY TEST URINE: NEGATIVE
EXT. CONTROL: NORMAL

## 2021-09-27 PROCEDURE — C1781 MESH (IMPLANTABLE): HCPCS | Performed by: PODIATRIST

## 2021-09-27 PROCEDURE — C9290 INJ, BUPIVACAINE LIPOSOME: HCPCS | Performed by: STUDENT IN AN ORGANIZED HEALTH CARE EDUCATION/TRAINING PROGRAM

## 2021-09-27 PROCEDURE — 76000 FLUOROSCOPY <1 HR PHYS/QHP: CPT

## 2021-09-27 PROCEDURE — C1762 CONN TISS, HUMAN(INC FASCIA): HCPCS | Performed by: PODIATRIST

## 2021-09-27 PROCEDURE — 81025 URINE PREGNANCY TEST: CPT | Performed by: PODIATRIST

## 2021-09-27 DEVICE — GRAFT PROLAYER 4 X 8CM 0.4-1MMTHCK: Type: IMPLANTABLE DEVICE | Site: ACHILLES TENDON | Status: FUNCTIONAL

## 2021-09-27 RX ORDER — LIDOCAINE HYDROCHLORIDE 20 MG/ML
INJECTION, SOLUTION EPIDURAL; INFILTRATION; INTRACAUDAL; PERINEURAL AS NEEDED
Status: DISCONTINUED | OUTPATIENT
Start: 2021-09-27 | End: 2021-09-27

## 2021-09-27 RX ORDER — HYDROMORPHONE HCL/PF 1 MG/ML
0.5 SYRINGE (ML) INJECTION
Status: DISCONTINUED | OUTPATIENT
Start: 2021-09-27 | End: 2021-09-27

## 2021-09-27 RX ORDER — ACETAMINOPHEN 325 MG/1
650 TABLET ORAL EVERY 6 HOURS PRN
Status: DISCONTINUED | OUTPATIENT
Start: 2021-09-27 | End: 2021-09-27 | Stop reason: HOSPADM

## 2021-09-27 RX ORDER — MIDAZOLAM HYDROCHLORIDE 2 MG/2ML
INJECTION, SOLUTION INTRAMUSCULAR; INTRAVENOUS AS NEEDED
Status: DISCONTINUED | OUTPATIENT
Start: 2021-09-27 | End: 2021-09-27

## 2021-09-27 RX ORDER — MAGNESIUM HYDROXIDE 1200 MG/15ML
LIQUID ORAL AS NEEDED
Status: DISCONTINUED | OUTPATIENT
Start: 2021-09-27 | End: 2021-09-27 | Stop reason: HOSPADM

## 2021-09-27 RX ORDER — CEFAZOLIN SODIUM 1 G/50ML
SOLUTION INTRAVENOUS AS NEEDED
Status: DISCONTINUED | OUTPATIENT
Start: 2021-09-27 | End: 2021-09-27

## 2021-09-27 RX ORDER — ONDANSETRON 2 MG/ML
4 INJECTION INTRAMUSCULAR; INTRAVENOUS ONCE
Status: DISCONTINUED | OUTPATIENT
Start: 2021-09-27 | End: 2021-09-27 | Stop reason: HOSPADM

## 2021-09-27 RX ORDER — FENTANYL CITRATE 50 UG/ML
50 INJECTION, SOLUTION INTRAMUSCULAR; INTRAVENOUS
Status: DISCONTINUED | OUTPATIENT
Start: 2021-09-27 | End: 2021-09-27 | Stop reason: HOSPADM

## 2021-09-27 RX ORDER — SODIUM CHLORIDE 9 MG/ML
125 INJECTION, SOLUTION INTRAVENOUS CONTINUOUS
Status: DISCONTINUED | OUTPATIENT
Start: 2021-09-27 | End: 2021-09-27 | Stop reason: HOSPADM

## 2021-09-27 RX ORDER — FENTANYL CITRATE 50 UG/ML
INJECTION, SOLUTION INTRAMUSCULAR; INTRAVENOUS AS NEEDED
Status: DISCONTINUED | OUTPATIENT
Start: 2021-09-27 | End: 2021-09-27

## 2021-09-27 RX ORDER — GLYCOPYRROLATE 0.2 MG/ML
INJECTION INTRAMUSCULAR; INTRAVENOUS AS NEEDED
Status: DISCONTINUED | OUTPATIENT
Start: 2021-09-27 | End: 2021-09-27

## 2021-09-27 RX ORDER — KETOROLAC TROMETHAMINE 30 MG/ML
30 INJECTION, SOLUTION INTRAMUSCULAR; INTRAVENOUS ONCE
Status: COMPLETED | OUTPATIENT
Start: 2021-09-27 | End: 2021-09-27

## 2021-09-27 RX ORDER — HYDROMORPHONE HCL/PF 1 MG/ML
0.5 SYRINGE (ML) INJECTION
Status: COMPLETED | OUTPATIENT
Start: 2021-09-27 | End: 2021-09-27

## 2021-09-27 RX ORDER — ONDANSETRON 2 MG/ML
4 INJECTION INTRAMUSCULAR; INTRAVENOUS EVERY 6 HOURS PRN
Status: DISCONTINUED | OUTPATIENT
Start: 2021-09-27 | End: 2021-09-27 | Stop reason: HOSPADM

## 2021-09-27 RX ORDER — PROPOFOL 10 MG/ML
INJECTION, EMULSION INTRAVENOUS AS NEEDED
Status: DISCONTINUED | OUTPATIENT
Start: 2021-09-27 | End: 2021-09-27

## 2021-09-27 RX ORDER — NEOSTIGMINE METHYLSULFATE 1 MG/ML
INJECTION INTRAVENOUS AS NEEDED
Status: DISCONTINUED | OUTPATIENT
Start: 2021-09-27 | End: 2021-09-27

## 2021-09-27 RX ORDER — ONDANSETRON 2 MG/ML
INJECTION INTRAMUSCULAR; INTRAVENOUS AS NEEDED
Status: DISCONTINUED | OUTPATIENT
Start: 2021-09-27 | End: 2021-09-27

## 2021-09-27 RX ORDER — BUPRENORPHINE HYDROCHLORIDE AND NALOXONE HYDROCHLORIDE 5.7; 1.4 MG/1; MG/1
1 TABLET, ORALLY DISINTEGRATING SUBLINGUAL
COMMUNITY

## 2021-09-27 RX ORDER — ROPIVACAINE HYDROCHLORIDE 5 MG/ML
INJECTION, SOLUTION EPIDURAL; INFILTRATION; PERINEURAL
Status: COMPLETED | OUTPATIENT
Start: 2021-09-27 | End: 2021-09-27

## 2021-09-27 RX ORDER — PROMETHAZINE HYDROCHLORIDE 25 MG/ML
12.5 INJECTION, SOLUTION INTRAMUSCULAR; INTRAVENOUS EVERY 4 HOURS PRN
Status: DISCONTINUED | OUTPATIENT
Start: 2021-09-27 | End: 2021-09-27 | Stop reason: HOSPADM

## 2021-09-27 RX ADMIN — ROPIVACAINE HYDROCHLORIDE 30 ML: 5 INJECTION, SOLUTION EPIDURAL; INFILTRATION; PERINEURAL at 10:32

## 2021-09-27 RX ADMIN — HYDROMORPHONE HYDROCHLORIDE 0.5 MG: 1 INJECTION, SOLUTION INTRAMUSCULAR; INTRAVENOUS; SUBCUTANEOUS at 14:00

## 2021-09-27 RX ADMIN — GLYCOPYRROLATE 0.4 MG: 0.2 INJECTION, SOLUTION INTRAMUSCULAR; INTRAVENOUS at 12:42

## 2021-09-27 RX ADMIN — SODIUM CHLORIDE: 0.9 INJECTION, SOLUTION INTRAVENOUS at 12:42

## 2021-09-27 RX ADMIN — HYDROMORPHONE HYDROCHLORIDE 0.5 MG: 1 INJECTION, SOLUTION INTRAMUSCULAR; INTRAVENOUS; SUBCUTANEOUS at 13:23

## 2021-09-27 RX ADMIN — HYDROMORPHONE HYDROCHLORIDE 0.5 MG: 1 INJECTION, SOLUTION INTRAMUSCULAR; INTRAVENOUS; SUBCUTANEOUS at 13:52

## 2021-09-27 RX ADMIN — HYDROMORPHONE HYDROCHLORIDE 0.5 MG: 1 INJECTION, SOLUTION INTRAMUSCULAR; INTRAVENOUS; SUBCUTANEOUS at 13:43

## 2021-09-27 RX ADMIN — HYDROMORPHONE HYDROCHLORIDE 0.5 MG: 1 INJECTION, SOLUTION INTRAMUSCULAR; INTRAVENOUS; SUBCUTANEOUS at 14:16

## 2021-09-27 RX ADMIN — FENTANYL CITRATE 100 MCG: 50 INJECTION INTRAMUSCULAR; INTRAVENOUS at 10:10

## 2021-09-27 RX ADMIN — SODIUM CHLORIDE 125 ML/HR: 0.9 INJECTION, SOLUTION INTRAVENOUS at 09:28

## 2021-09-27 RX ADMIN — FENTANYL CITRATE 50 MCG: 50 INJECTION INTRAMUSCULAR; INTRAVENOUS at 13:13

## 2021-09-27 RX ADMIN — NEOSTIGMINE METHYLSULFATE 3 MG: 1 INJECTION INTRAVENOUS at 12:42

## 2021-09-27 RX ADMIN — ONDANSETRON 4 MG: 2 INJECTION INTRAMUSCULAR; INTRAVENOUS at 10:43

## 2021-09-27 RX ADMIN — FENTANYL CITRATE 50 MCG: 50 INJECTION INTRAMUSCULAR; INTRAVENOUS at 11:36

## 2021-09-27 RX ADMIN — MIDAZOLAM 4 MG: 1 INJECTION INTRAMUSCULAR; INTRAVENOUS at 10:10

## 2021-09-27 RX ADMIN — HYDROMORPHONE HYDROCHLORIDE 0.5 MG: 1 INJECTION, SOLUTION INTRAMUSCULAR; INTRAVENOUS; SUBCUTANEOUS at 13:33

## 2021-09-27 RX ADMIN — FENTANYL CITRATE 50 MCG: 50 INJECTION INTRAMUSCULAR; INTRAVENOUS at 13:04

## 2021-09-27 RX ADMIN — PROPOFOL 200 MG: 10 INJECTION, EMULSION INTRAVENOUS at 10:40

## 2021-09-27 RX ADMIN — FENTANYL CITRATE 50 MCG: 50 INJECTION INTRAMUSCULAR; INTRAVENOUS at 10:43

## 2021-09-27 RX ADMIN — ACETAMINOPHEN 650 MG: 325 TABLET, FILM COATED ORAL at 15:45

## 2021-09-27 RX ADMIN — LIDOCAINE HYDROCHLORIDE 100 MG: 20 INJECTION, SOLUTION EPIDURAL; INFILTRATION; INTRACAUDAL; PERINEURAL at 10:40

## 2021-09-27 RX ADMIN — FENTANYL CITRATE 50 MCG: 50 INJECTION INTRAMUSCULAR; INTRAVENOUS at 10:40

## 2021-09-27 RX ADMIN — HYDROMORPHONE HYDROCHLORIDE 0.5 MG: 1 INJECTION, SOLUTION INTRAMUSCULAR; INTRAVENOUS; SUBCUTANEOUS at 14:27

## 2021-09-27 RX ADMIN — KETOROLAC TROMETHAMINE 30 MG: 30 INJECTION, SOLUTION INTRAMUSCULAR at 15:45

## 2021-09-27 RX ADMIN — MIDAZOLAM 2 MG: 1 INJECTION INTRAMUSCULAR; INTRAVENOUS at 10:18

## 2021-09-27 RX ADMIN — FENTANYL CITRATE 50 MCG: 50 INJECTION INTRAMUSCULAR; INTRAVENOUS at 11:51

## 2021-09-27 RX ADMIN — PROPOFOL 30 MG: 10 INJECTION, EMULSION INTRAVENOUS at 10:25

## 2021-09-27 RX ADMIN — CEFAZOLIN SODIUM 1000 MG: 1 SOLUTION INTRAVENOUS at 10:37

## 2021-09-27 RX ADMIN — HYDROMORPHONE HYDROCHLORIDE 0.5 MG: 1 INJECTION, SOLUTION INTRAMUSCULAR; INTRAVENOUS; SUBCUTANEOUS at 14:21

## 2021-09-27 NOTE — ANESTHESIA PROCEDURE NOTES
Peripheral Block    Patient location during procedure: holding area  Start time: 9/27/2021 10:09 AM  Reason for block: at surgeon's request and post-op pain management  Staffing  Performed: Anesthesiologist   Anesthesiologist: Tadeo Mcghee DO  Preanesthetic Checklist  Completed: patient identified, IV checked, site marked, risks and benefits discussed, surgical consent, monitors and equipment checked, pre-op evaluation and timeout performed  Peripheral Block  Patient position: prone  Prep: ChloraPrep  Patient monitoring: heart rate, continuous pulse ox and frequent blood pressure checks  Block type: popliteal  Laterality: left  Injection technique: single-shot  Procedures: nerve stimulator  Ultrasound permanent image savedropivacaine (NAROPIN) 0 5 % perineural infiltration, 30 mL  Needle  Needle type: Stimuplex   Needle gauge: 27 G  Needle length: 10 cm  Needle localization: anatomical landmarks and nerve stimulator  Assessment  Injection assessment: incremental injection and negative aspiration for heme  Heart rate change: no  Slow fractionated injection: yes  Post-procedure:  site cleaned  patient tolerated the procedure well with no immediate complications

## 2021-09-27 NOTE — ANESTHESIA PREPROCEDURE EVALUATION
Procedure: Foot deep peroneal nerve decompression; Heel achilles debridement with repair, Poss FHL tendon transfer (Left Ankle)  RECESSION GASTROC OPEN (Left Leg Lower)    Relevant Problems   HEMATOLOGY   (+) Anemia      Other   (+) Narcotic abuse in remission (HCC)   (+) Tobacco abuse        Physical Exam    Airway    Mallampati score: II  TM Distance: >3 FB  Neck ROM: full     Dental   No notable dental hx     Cardiovascular  Rhythm: regular, Rate: normal, Cardiovascular exam normal    Pulmonary  Pulmonary exam normal Breath sounds clear to auscultation,     Other Findings        Anesthesia Plan  ASA Score- 2     Anesthesia Type- general with ASA Monitors  Additional Monitors:   Airway Plan: LMA  Plan Factors-    Chart reviewed  Patient summary reviewed  Patient is not a current smoker  Patient instructed to abstain from smoking on day of procedure  Patient did not smoke on day of surgery  Induction- intravenous  Postoperative Plan-     Informed Consent- Anesthetic plan and risks discussed with patient

## 2021-09-27 NOTE — ANESTHESIA POSTPROCEDURE EVALUATION
Post-Op Assessment Note    CV Status:  Stable  Pain Score: 10    Multimodal analgesia used between 6 hours prior to anesthesia start to PACU discharge    Mental Status:  Alert and awake   Hydration Status:  Euvolemic   PONV Controlled:  Controlled   Airway Patency:  Patent   Two or more mitigation strategies used for obstructive sleep apnea   Post Op Vitals Reviewed: Yes      Staff: Anesthesiologist, CRNA   Comments: pt with pain scale out of proportion to surgery  Hx of opioid abuse and low pain tolerance  Will attempt to make her as comfortable as possible but she kalpana follow up with her Pain management team post op        No complications documented      BP      Temp      Pulse     Resp      SpO2

## (undated) DEVICE — SUT VICRYL 3-0 SH 27 IN J416H

## (undated) DEVICE — PLUMEPEN PRO 10FT

## (undated) DEVICE — CRADLE EXTREMITY UNIVERSAL CONTOURED

## (undated) DEVICE — 10FR FRAZIER SUCTION HANDLE: Brand: CARDINAL HEALTH

## (undated) DEVICE — CUFF TOURNIQUET 30 X 4 IN QUICK CONNECT DISP 1BLA

## (undated) DEVICE — SCD SEQUENTIAL COMPRESSION COMFORT SLEEVE MEDIUM KNEE LENGTH: Brand: KENDALL SCD

## (undated) DEVICE — GLOVE INDICATOR PI UNDERGLOVE SZ 7 BLUE

## (undated) DEVICE — 3M™ STERI-STRIP™ REINFORCED ADHESIVE SKIN CLOSURES, R1547, 1/2 IN X 4 IN (12 MM X 100 MM), 6 STRIPS/ENVELOPE: Brand: 3M™ STERI-STRIP™

## (undated) DEVICE — 3M™ TEGADERM™ TRANSPARENT FILM DRESSING FRAME STYLE, 1626W, 4 IN X 4-3/4 IN (10 CM X 12 CM), 50/CT 4CT/CASE: Brand: 3M™ TEGADERM™

## (undated) DEVICE — GLOVE SRG BIOGEL 8

## (undated) DEVICE — COBAN 4 IN STERILE

## (undated) DEVICE — GLOVE SRG BIOGEL ORTHOPEDIC 8

## (undated) DEVICE — BLADE SAGITTAL 25.6 X 9.5MM

## (undated) DEVICE — ASTOUND STANDARD SURGICAL GOWN, XXL: Brand: CONVERTORS

## (undated) DEVICE — ACE WRAP 4 IN UNSTERILE

## (undated) DEVICE — GLOVE INDICATOR PI UNDERGLOVE SZ 8 BLUE

## (undated) DEVICE — PREP PAD BNS: Brand: CONVERTORS

## (undated) DEVICE — GLOVE SRG BIOGEL 7

## (undated) DEVICE — NEEDLE 25G X 1 1/2

## (undated) DEVICE — CAST PADDING 4 IN UNSTERILE

## (undated) DEVICE — U-DRAPE: Brand: CONVERTORS

## (undated) DEVICE — OCCLUSIVE GAUZE STRIP,3% BISMUTH TRIBROMOPHENATE IN PETROLATUM BLEND: Brand: XEROFORM

## (undated) DEVICE — GAUZE SPONGES,16 PLY: Brand: CURITY

## (undated) DEVICE — SYRINGE 3ML LL

## (undated) DEVICE — BETHLEHEM UNIVERSAL  MIONR EXT: Brand: CARDINAL HEALTH

## (undated) DEVICE — SUT MONOCRYL 4-0 PS-2 27 IN Y426H

## (undated) DEVICE — CAST PADDING 4 IN SYNTHETIC NON-STRL

## (undated) DEVICE — STOCKINETTE REGULAR

## (undated) DEVICE — CUFF TOURNIQUET 18 X 4 IN QUICK CONNECT DISP 1 BLADDER

## (undated) DEVICE — GLOVE PI ULTRA TOUCH SZ.6.5

## (undated) DEVICE — WEBRIL 6 IN UNSTERILE

## (undated) DEVICE — SYRINGE 10ML LL

## (undated) DEVICE — 2000CC GUARDIAN II: Brand: GUARDIAN

## (undated) DEVICE — GLOVE INDICATOR PI UNDERGLOVE SZ 6.5 BLUE

## (undated) DEVICE — SUT FIBERWIRE 3-0 3/8 CIRCLE DIAMOND PT 18IN AR-7225

## (undated) DEVICE — DRESSING XEROFORM 5 X 9

## (undated) DEVICE — DRAPE C-ARMOUR

## (undated) DEVICE — POSITION CUSHION INSERT LARGE PRONEVIEW

## (undated) DEVICE — GLOVE SRG BIOGEL 6.5

## (undated) DEVICE — BANDAGE, ESMARK LF STR 6"X9' (20/CS): Brand: CYPRESS

## (undated) DEVICE — INTENDED FOR TISSUE SEPARATION, AND OTHER PROCEDURES THAT REQUIRE A SHARP SURGICAL BLADE TO PUNCTURE OR CUT.: Brand: BARD-PARKER ® CARBON RIB-BACK BLADES

## (undated) DEVICE — STANDARD SURGICAL GOWN, L: Brand: CONVERTORS

## (undated) DEVICE — DRAPE C-ARM X-RAY

## (undated) DEVICE — CHLORAPREP HI-LITE 26ML ORANGE